# Patient Record
Sex: FEMALE | Race: WHITE | NOT HISPANIC OR LATINO | Employment: FULL TIME | URBAN - NONMETROPOLITAN AREA
[De-identification: names, ages, dates, MRNs, and addresses within clinical notes are randomized per-mention and may not be internally consistent; named-entity substitution may affect disease eponyms.]

---

## 2017-03-09 ENCOUNTER — HOSPITAL ENCOUNTER (EMERGENCY)
Facility: HOSPITAL | Age: 42
Discharge: HOME | End: 2017-03-10
Attending: EMERGENCY MEDICINE | Admitting: EMERGENCY MEDICINE
Payer: COMMERCIAL

## 2017-03-09 ENCOUNTER — HOSPITAL ENCOUNTER (OUTPATIENT)
Dept: CT IMAGING | Facility: HOSPITAL | Age: 42
Discharge: HOME | End: 2017-03-09
Attending: EMERGENCY MEDICINE
Payer: COMMERCIAL

## 2017-03-09 DIAGNOSIS — R10.9 ABDOMINAL PAIN: Primary | ICD-10-CM

## 2017-03-09 DIAGNOSIS — R55 SYNCOPE AND COLLAPSE: ICD-10-CM

## 2017-03-09 DIAGNOSIS — R55 NEAR SYNCOPE: ICD-10-CM

## 2017-03-09 PROCEDURE — 80053 COMPREHEN METABOLIC PANEL: CPT | Performed by: EMERGENCY MEDICINE

## 2017-03-09 PROCEDURE — 70450 CT HEAD/BRAIN W/O DYE: CPT

## 2017-03-09 PROCEDURE — 36415 COLL VENOUS BLD VENIPUNCTURE: CPT | Performed by: EMERGENCY MEDICINE

## 2017-03-09 PROCEDURE — 83690 ASSAY OF LIPASE: CPT | Performed by: EMERGENCY MEDICINE

## 2017-03-10 LAB
ALBUMIN SERPL BCP-MCNC: 3.8 G/DL (ref 3.5–5)
ALP SERPL-CCNC: 60 U/L (ref 46–116)
ALT SERPL W P-5'-P-CCNC: 25 U/L (ref 12–78)
ANION GAP SERPL CALCULATED.3IONS-SCNC: 8 MMOL/L (ref 4–13)
AST SERPL W P-5'-P-CCNC: 16 U/L (ref 5–45)
ATRIAL RATE: 99 BPM
BACTERIA UR QL AUTO: ABNORMAL /HPF
BASOPHILS # BLD AUTO: 0.01 THOUSANDS/ΜL (ref 0–0.1)
BILIRUB SERPL-MCNC: 1.73 MG/DL (ref 0.2–1)
BILIRUB UR QL STRIP: NEGATIVE
BUN SERPL-MCNC: 19 MG/DL (ref 5–25)
CALCIUM SERPL-MCNC: 8.8 MG/DL (ref 8.3–10.1)
CHLORIDE SERPL-SCNC: 100 MMOL/L (ref 100–108)
CLARITY UR: ABNORMAL
CO2 SERPL-SCNC: 28 MMOL/L (ref 21–32)
COLOR UR: YELLOW
CREAT SERPL-MCNC: 0.79 MG/DL (ref 0.6–1.3)
EOSINOPHIL # BLD AUTO: 0.01 THOUSAND/ΜL (ref 0–0.61)
EOSINOPHIL NFR BLD AUTO: 0 % (ref 0–6)
ERYTHROCYTE [DISTWIDTH] IN BLOOD BY AUTOMATED COUNT: 12.8 % (ref 11.6–15.1)
GFR SERPL CREATININE-BSD FRML MDRD: >60 ML/MIN/1.73SQ M
GLUCOSE SERPL-MCNC: 118 MG/DL (ref 65–140)
GLUCOSE UR STRIP-MCNC: NEGATIVE MG/DL
HCT VFR BLD AUTO: 40.2 % (ref 34.8–46.1)
HGB BLD-MCNC: 13.6 G/DL (ref 11.5–15.4)
HGB UR QL STRIP.AUTO: ABNORMAL
IMM GRANULOCYTES NFR BLD AUTO: 12 % (ref 0–2)
KETONES UR STRIP-MCNC: NEGATIVE MG/DL
LEUKOCYTE ESTERASE UR QL STRIP: NEGATIVE
LIPASE SERPL-CCNC: 72 U/L (ref 73–393)
LYMPHOCYTES # BLD AUTO: 1.17 THOUSANDS/ΜL (ref 0.6–4.47)
LYMPHOCYTES NFR BLD AUTO: 5 % (ref 14–44)
MCH RBC QN AUTO: 32.7 PG (ref 26.8–34.3)
MCHC RBC AUTO-ENTMCNC: 33.8 G/DL (ref 31.4–37.4)
MCV RBC AUTO: 97 FL (ref 82–98)
MONOCYTES # BLD AUTO: 0.45 THOUSAND/ΜL (ref 0.17–1.22)
MONOCYTES NFR BLD AUTO: 0 % (ref 4–12)
MUCOUS THREADS UR QL AUTO: ABNORMAL
NEUTROPHILS # BLD AUTO: 7.78 THOUSANDS/ΜL (ref 1.85–7.62)
NEUTS SEG NFR BLD AUTO: 83 % (ref 43–75)
NITRITE UR QL STRIP: NEGATIVE
NON-SQ EPI CELLS URNS QL MICRO: ABNORMAL /HPF
P AXIS: 36 DEGREES
PH UR STRIP.AUTO: 5.5 [PH] (ref 4.5–8)
PLATELET # BLD AUTO: 344 THOUSANDS/UL (ref 149–390)
PMV BLD AUTO: 9.4 FL (ref 8.9–12.7)
POTASSIUM SERPL-SCNC: 3.4 MMOL/L (ref 3.5–5.3)
PR INTERVAL: 152 MS
PROT SERPL-MCNC: 8 G/DL (ref 6.4–8.2)
PROT UR STRIP-MCNC: NEGATIVE MG/DL
QRS AXIS: 9 DEGREES
QRSD INTERVAL: 78 MS
QT INTERVAL: 342 MS
QTC INTERVAL: 438 MS
RBC # BLD AUTO: 4.16 MILLION/UL (ref 3.81–5.12)
RBC #/AREA URNS AUTO: ABNORMAL /HPF
SODIUM SERPL-SCNC: 136 MMOL/L (ref 136–145)
SP GR UR STRIP.AUTO: >1.03 (ref 1–1.03)
T WAVE AXIS: 11 DEGREES
TROPONIN I SERPL-MCNC: <0.02 NG/ML
UROBILINOGEN UR QL STRIP.AUTO: 0.2 E.U./DL
VENTRICULAR RATE: 99 BPM
WBC # BLD AUTO: 9.42 THOUSAND/UL (ref 4.31–10.16)
WBC #/AREA URNS AUTO: ABNORMAL /HPF

## 2017-03-10 PROCEDURE — 96375 TX/PRO/DX INJ NEW DRUG ADDON: CPT

## 2017-03-10 PROCEDURE — 93005 ELECTROCARDIOGRAM TRACING: CPT | Performed by: EMERGENCY MEDICINE

## 2017-03-10 PROCEDURE — 99284 EMERGENCY DEPT VISIT MOD MDM: CPT

## 2017-03-10 PROCEDURE — 87086 URINE CULTURE/COLONY COUNT: CPT | Performed by: EMERGENCY MEDICINE

## 2017-03-10 PROCEDURE — 96374 THER/PROPH/DIAG INJ IV PUSH: CPT

## 2017-03-10 PROCEDURE — 85025 COMPLETE CBC W/AUTO DIFF WBC: CPT | Performed by: EMERGENCY MEDICINE

## 2017-03-10 PROCEDURE — 84484 ASSAY OF TROPONIN QUANT: CPT | Performed by: EMERGENCY MEDICINE

## 2017-03-10 PROCEDURE — 81001 URINALYSIS AUTO W/SCOPE: CPT | Performed by: EMERGENCY MEDICINE

## 2017-03-11 LAB — BACTERIA UR CULT: NORMAL

## 2020-06-01 ENCOUNTER — DOCTOR'S OFFICE (OUTPATIENT)
Dept: URBAN - METROPOLITAN AREA CLINIC 125 | Facility: CLINIC | Age: 45
Setting detail: OPHTHALMOLOGY
End: 2020-06-01
Payer: COMMERCIAL

## 2020-06-01 ENCOUNTER — RX ONLY (RX ONLY)
Age: 45
End: 2020-06-01

## 2020-06-01 DIAGNOSIS — H52.4: ICD-10-CM

## 2020-06-01 DIAGNOSIS — H52.223: ICD-10-CM

## 2020-06-01 PROCEDURE — 92015 DETERMINE REFRACTIVE STATE: CPT | Performed by: OPTOMETRIST

## 2020-06-01 PROCEDURE — 92004 COMPRE OPH EXAM NEW PT 1/>: CPT | Performed by: OPTOMETRIST

## 2020-06-01 ASSESSMENT — CONFRONTATIONAL VISUAL FIELD TEST (CVF)
OS_FINDINGS: FULL
OD_FINDINGS: FULL

## 2020-06-03 ASSESSMENT — REFRACTION_CURRENTRX
OD_AXIS: 002
OD_VPRISM_DIRECTION: SV
OD_CYLINDER: -5.25
OD_OVR_VA: 20/
OS_OVR_VA: 20/
OD_SPHERE: -0.50
OS_VPRISM_DIRECTION: SV
OS_SPHERE: -0.25
OS_CYLINDER: -5.00
OS_AXIS: 171

## 2020-06-03 ASSESSMENT — REFRACTION_MANIFEST
OD_AXIS: 005
OD_ADD: +1.50
OS_ADD: +1.50
OD_CYLINDER: -5.25
OD_SPHERE: PLANO
OS_AXIS: 175
OS_VA1: 20/25
OS_SPHERE: PLANO
OD_VA1: 20/25
OS_CYLINDER: -5.00

## 2020-06-03 ASSESSMENT — KERATOMETRY
OS_K1POWER_DIOPTERS: 41.50
OS_AXISANGLE_DEGREES: 001
OD_AXISANGLE_DEGREES: 005
OD_K1POWER_DIOPTERS: 41.25
OS_K2POWER_DIOPTERS: 46.25
OD_K2POWER_DIOPTERS: 46.00

## 2020-06-03 ASSESSMENT — SPHEQUIV_DERIVED
OD_SPHEQUIV: -2
OS_SPHEQUIV: -2.5

## 2020-06-03 ASSESSMENT — REFRACTION_AUTOREFRACTION
OS_AXIS: 178
OS_CYLINDER: -4.50
OD_CYLINDER: -4.50
OS_SPHERE: -0.25
OD_AXIS: 008
OD_SPHERE: +0.25

## 2020-06-03 ASSESSMENT — VISUAL ACUITY
OS_BCVA: 20/40
OD_BCVA: 20/20-1

## 2020-06-03 ASSESSMENT — AXIALLENGTH_DERIVED
OS_AL: 24.4589
OD_AL: 24.3487

## 2020-07-31 ENCOUNTER — DOCTOR'S OFFICE (OUTPATIENT)
Dept: URBAN - NONMETROPOLITAN AREA CLINIC 1 | Facility: CLINIC | Age: 45
Setting detail: OPHTHALMOLOGY
End: 2020-07-31
Payer: COMMERCIAL

## 2020-07-31 DIAGNOSIS — H40.2234: ICD-10-CM

## 2020-07-31 PROCEDURE — 76514 ECHO EXAM OF EYE THICKNESS: CPT | Performed by: OPHTHALMOLOGY

## 2020-07-31 PROCEDURE — 92133 CPTRZD OPH DX IMG PST SGM ON: CPT | Performed by: OPHTHALMOLOGY

## 2020-07-31 PROCEDURE — 92014 COMPRE OPH EXAM EST PT 1/>: CPT | Performed by: OPHTHALMOLOGY

## 2020-07-31 ASSESSMENT — REFRACTION_CURRENTRX
OD_OVR_VA: 20/
OD_CYLINDER: -5.25
OD_SPHERE: -0.25
OS_AXIS: 170
OS_ADD: +1.00
OD_VPRISM_DIRECTION: PROGS
OD_ADD: +1.00
OS_CYLINDER: -4.75
OS_SPHERE: PLANO
OD_AXIS: 012
OS_VPRISM_DIRECTION: PROGS
OS_OVR_VA: 20/

## 2020-07-31 ASSESSMENT — REFRACTION_MANIFEST
OS_ADD: +1.50
OD_AXIS: 005
OS_AXIS: 175
OS_VA1: 20/25
OD_ADD: +1.50
OS_SPHERE: PLANO
OD_CYLINDER: -5.25
OD_SPHERE: PLANO
OD_VA1: 20/25
OS_CYLINDER: -5.00

## 2020-07-31 ASSESSMENT — REFRACTION_AUTOREFRACTION
OD_AXIS: 006
OD_SPHERE: +0.25
OS_CYLINDER: -4.50
OD_CYLINDER: -4.75
OS_SPHERE: -0.25
OS_AXIS: 176

## 2020-07-31 ASSESSMENT — KERATOMETRY
OD_K2POWER_DIOPTERS: 47.50
OD_AXISANGLE_DEGREES: 095
OS_K1POWER_DIOPTERS: 41.50
OS_AXISANGLE_DEGREES: 088
OS_K2POWER_DIOPTERS: 46.25
OD_K1POWER_DIOPTERS: 42.25

## 2020-07-31 ASSESSMENT — VISUAL ACUITY
OS_BCVA: 20/25-1
OD_BCVA: 20/20

## 2020-07-31 ASSESSMENT — SPHEQUIV_DERIVED
OD_SPHEQUIV: -2.125
OS_SPHEQUIV: -2.5

## 2020-07-31 ASSESSMENT — PACHYMETRY
OS_CT_UM: 587
OD_CT_UM: 580
OD_CT_CORRECTION: -3
OS_CT_CORRECTION: -3

## 2020-07-31 ASSESSMENT — AXIALLENGTH_DERIVED
OS_AL: 24.4589
OD_AL: 23.9192

## 2020-07-31 ASSESSMENT — CONFRONTATIONAL VISUAL FIELD TEST (CVF)
OD_FINDINGS: FULL
OS_FINDINGS: FULL

## 2020-09-02 ENCOUNTER — OFFICE VISIT (OUTPATIENT)
Dept: FAMILY MEDICINE CLINIC | Facility: CLINIC | Age: 45
End: 2020-09-02
Payer: COMMERCIAL

## 2020-09-02 VITALS
DIASTOLIC BLOOD PRESSURE: 82 MMHG | TEMPERATURE: 98.2 F | HEART RATE: 96 BPM | HEIGHT: 62 IN | WEIGHT: 139.8 LBS | OXYGEN SATURATION: 97 % | SYSTOLIC BLOOD PRESSURE: 136 MMHG | BODY MASS INDEX: 25.73 KG/M2

## 2020-09-02 DIAGNOSIS — G43.019 INTRACTABLE MIGRAINE WITHOUT AURA AND WITHOUT STATUS MIGRAINOSUS: ICD-10-CM

## 2020-09-02 DIAGNOSIS — Z13.9 SCREENING FOR UNSPECIFIED CONDITION: ICD-10-CM

## 2020-09-02 DIAGNOSIS — F41.1 GENERALIZED ANXIETY DISORDER: Primary | ICD-10-CM

## 2020-09-02 DIAGNOSIS — Z13.220 SCREENING FOR HYPERLIPIDEMIA: ICD-10-CM

## 2020-09-02 DIAGNOSIS — R25.2 MUSCLE CRAMPS: ICD-10-CM

## 2020-09-02 DIAGNOSIS — Z13.29 SCREENING FOR THYROID DISORDER: ICD-10-CM

## 2020-09-02 PROCEDURE — 99203 OFFICE O/P NEW LOW 30 MIN: CPT | Performed by: PHYSICIAN ASSISTANT

## 2020-09-02 RX ORDER — RIZATRIPTAN BENZOATE 5 MG/1
5 TABLET, ORALLY DISINTEGRATING ORAL ONCE AS NEEDED
Qty: 20 TABLET | Refills: 0 | Status: SHIPPED | OUTPATIENT
Start: 2020-09-02 | End: 2021-12-02 | Stop reason: SDUPTHER

## 2020-09-02 RX ORDER — ACETAMINOPHEN 325 MG/1
650 TABLET ORAL EVERY 6 HOURS PRN
COMMUNITY

## 2020-09-02 RX ORDER — SERTRALINE HYDROCHLORIDE 25 MG/1
25 TABLET, FILM COATED ORAL DAILY
Qty: 30 TABLET | Refills: 0 | Status: SHIPPED | OUTPATIENT
Start: 2020-09-02 | End: 2020-11-18 | Stop reason: SDUPTHER

## 2020-09-02 NOTE — PROGRESS NOTES
Assessment/Plan:    Problem List Items Addressed This Visit     None      Visit Diagnoses     Generalized anxiety disorder    -  Primary    Relevant Medications    sertraline (ZOLOFT) 25 mg tablet    Screening for hyperlipidemia        Relevant Orders    Comprehensive metabolic panel    Lipid panel    Screening for thyroid disorder        Relevant Orders    TSH, 3rd generation with Free T4 reflex    Muscle cramps        Relevant Orders    Comprehensive metabolic panel    Screening for unspecified condition        Relevant Orders    CBC and differential    Comprehensive metabolic panel    Lipid panel    TSH, 3rd generation with Free T4 reflex    Intractable migraine without aura and without status migrainosus        Relevant Medications    acetaminophen (TYLENOL) 325 mg tablet    sertraline (ZOLOFT) 25 mg tablet    rizatriptan (MAXALT-MLT) 5 MG disintegrating tablet           Diagnoses and all orders for this visit:    Generalized anxiety disorder  -     sertraline (ZOLOFT) 25 mg tablet; Take 1 tablet (25 mg total) by mouth daily    Screening for hyperlipidemia  -     Comprehensive metabolic panel; Future  -     Lipid panel; Future    Screening for thyroid disorder  -     TSH, 3rd generation with Free T4 reflex; Future    Muscle cramps  -     Comprehensive metabolic panel; Future    Screening for unspecified condition  -     CBC and differential; Future  -     Comprehensive metabolic panel; Future  -     Lipid panel; Future  -     TSH, 3rd generation with Free T4 reflex; Future    Intractable migraine without aura and without status migrainosus  -     rizatriptan (MAXALT-MLT) 5 MG disintegrating tablet; Take 1 tablet (5 mg total) by mouth once as needed for migraine for up to 1 dose May repeat in 2 hours if needed    Other orders  -     acetaminophen (TYLENOL) 325 mg tablet; Take 650 mg by mouth every 6 (six) hours as needed for mild pain        Will start on zoloft and see her back in one month     Refilled Maxalt Refused flu shot  Screening labs ordered  Subjective:      Patient ID: Khushboo Narvaez is a 41 y o  female  Jluis Mckeon is here today to get reestablished  She is complaining of worsening anxiety  She has always been an anxious person, but the pandemic has put her over the edge  She feels like when she wakes up in the morning, her heart races the entire day  She has a lot of stressors in her life  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, 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, and her daughter is in college  She was prescribed ativan many years ago, but never took it  She would like to start something for anxiety  She has noticed that she has a short temper since her anxiety has been elevated  She is sleeping well  She denies any suicidal or homicidal thoughts  She would also like to get up to date with her screening labs  She has been getting muscle cramps in bilateral calf muscles  She does not exercise regularly  She knows that she does not drink enough fluids  She gets occasional migraine headaches  She does have photophobia and phonophobia associated with the headaches  She used to have Maxalt that she would use as needed  She gets on about every 3 months  She is asking for a refill  She is up to date with her gynecology exams, eye exams, and dental cleanings  She is scheduled for a mammogram in October  She refuses a flu shot  The following portions of the patient's history were reviewed and updated as appropriate:   She has no past medical history on file  ,  does not have a problem list on file  ,   has a past surgical history that includes Cholecystectomy;  section; and Sinus surgery  ,  family history includes No Known Problems in her father and mother  ,   reports that she has never smoked  She has never used smokeless tobacco  She reports previous alcohol use  She reports previous drug use ,  has No Known Allergies     Current Outpatient Medications   Medication Sig Dispense Refill    acetaminophen (TYLENOL) 325 mg tablet Take 650 mg by mouth every 6 (six) hours as needed for mild pain      rizatriptan (MAXALT-MLT) 5 MG disintegrating tablet Take 1 tablet (5 mg total) by mouth once as needed for migraine for up to 1 dose May repeat in 2 hours if needed 20 tablet 0    sertraline (ZOLOFT) 25 mg tablet Take 1 tablet (25 mg total) by mouth daily 30 tablet 0     No current facility-administered medications for this visit  Review of Systems   Constitutional: Negative for chills, diaphoresis, fatigue and fever  HENT: Negative for congestion, ear pain, postnasal drip, rhinorrhea, sneezing, sore throat and trouble swallowing  Eyes: Negative for pain and visual disturbance  Respiratory: Negative for apnea, cough, shortness of breath and wheezing  Cardiovascular: Negative for chest pain and palpitations  Gastrointestinal: Negative for abdominal pain, constipation, diarrhea, nausea and vomiting  Genitourinary: Negative for dysuria and hematuria  Musculoskeletal: Negative for arthralgias, gait problem and myalgias  Neurological: Positive for headaches  Negative for dizziness, syncope, weakness, light-headedness and numbness  Psychiatric/Behavioral: Negative for suicidal ideas  The patient is nervous/anxious  Objective:  Vitals:    09/02/20 1108   BP: 136/82   Pulse: 96   Temp: 98 2 °F (36 8 °C)   SpO2: 97%   Weight: 63 4 kg (139 lb 12 8 oz)   Height: 5' 2" (1 575 m)     Body mass index is 25 57 kg/m²  Physical Exam  Vitals signs and nursing note reviewed  Constitutional:       Appearance: She is well-developed  HENT:      Head: Normocephalic and atraumatic  Right Ear: Tympanic membrane, ear canal and external ear normal       Left Ear: Tympanic membrane, ear canal and external ear normal       Nose: Nose normal       Mouth/Throat:      Pharynx: No oropharyngeal exudate or posterior oropharyngeal erythema     Eyes:      Pupils: Pupils are equal, round, and reactive to light  Neck:      Musculoskeletal: Normal range of motion and neck supple  Cardiovascular:      Rate and Rhythm: Normal rate and regular rhythm  Heart sounds: Normal heart sounds  No murmur  No friction rub  No gallop  Pulmonary:      Effort: Pulmonary effort is normal  No respiratory distress  Breath sounds: Normal breath sounds  No wheezing or rales  Abdominal:      General: Bowel sounds are normal       Palpations: Abdomen is soft  Tenderness: There is no abdominal tenderness  There is no guarding or rebound  Musculoskeletal: Normal range of motion  Lymphadenopathy:      Cervical: No cervical adenopathy  Skin:     General: Skin is warm and dry  Neurological:      Mental Status: She is alert and oriented to person, place, and time  Psychiatric:         Mood and Affect: Mood is anxious  Mood is not depressed  Behavior: Behavior normal          Thought Content: Thought content normal  Thought content does not include homicidal or suicidal ideation  Thought content does not include homicidal or suicidal plan  Judgment: Judgment normal          BMI Counseling: Body mass index is 25 57 kg/m²  The BMI is above normal  Nutrition recommendations include decreasing overall calorie intake, 3-5 servings of fruits/vegetables daily and moderation in carbohydrate intake  Exercise recommendations include exercising 3-5 times per week

## 2020-09-03 ENCOUNTER — APPOINTMENT (OUTPATIENT)
Dept: LAB | Facility: MEDICAL CENTER | Age: 45
End: 2020-09-03
Payer: COMMERCIAL

## 2020-09-03 DIAGNOSIS — Z13.29 SCREENING FOR THYROID DISORDER: ICD-10-CM

## 2020-09-03 DIAGNOSIS — R25.2 MUSCLE CRAMPS: ICD-10-CM

## 2020-09-03 DIAGNOSIS — Z13.9 SCREENING FOR UNSPECIFIED CONDITION: ICD-10-CM

## 2020-09-03 DIAGNOSIS — Z13.220 SCREENING FOR HYPERLIPIDEMIA: ICD-10-CM

## 2020-09-03 LAB
ALBUMIN SERPL BCP-MCNC: 4 G/DL (ref 3.5–5)
ALP SERPL-CCNC: 47 U/L (ref 46–116)
ALT SERPL W P-5'-P-CCNC: 18 U/L (ref 12–78)
ANION GAP SERPL CALCULATED.3IONS-SCNC: 6 MMOL/L (ref 4–13)
AST SERPL W P-5'-P-CCNC: 8 U/L (ref 5–45)
BASOPHILS # BLD AUTO: 0.08 THOUSANDS/ΜL (ref 0–0.1)
BASOPHILS NFR BLD AUTO: 1 % (ref 0–1)
BILIRUB SERPL-MCNC: 1.13 MG/DL (ref 0.2–1)
BUN SERPL-MCNC: 9 MG/DL (ref 5–25)
CALCIUM SERPL-MCNC: 9.3 MG/DL (ref 8.3–10.1)
CHLORIDE SERPL-SCNC: 107 MMOL/L (ref 100–108)
CHOLEST SERPL-MCNC: 213 MG/DL (ref 50–200)
CO2 SERPL-SCNC: 28 MMOL/L (ref 21–32)
CREAT SERPL-MCNC: 0.82 MG/DL (ref 0.6–1.3)
EOSINOPHIL # BLD AUTO: 0.1 THOUSAND/ΜL (ref 0–0.61)
EOSINOPHIL NFR BLD AUTO: 2 % (ref 0–6)
ERYTHROCYTE [DISTWIDTH] IN BLOOD BY AUTOMATED COUNT: 12.3 % (ref 11.6–15.1)
GFR SERPL CREATININE-BSD FRML MDRD: 87 ML/MIN/1.73SQ M
GLUCOSE P FAST SERPL-MCNC: 76 MG/DL (ref 65–99)
HCT VFR BLD AUTO: 39.4 % (ref 34.8–46.1)
HDLC SERPL-MCNC: 55 MG/DL
HGB BLD-MCNC: 13.1 G/DL (ref 11.5–15.4)
IMM GRANULOCYTES # BLD AUTO: 0.01 THOUSAND/UL (ref 0–0.2)
IMM GRANULOCYTES NFR BLD AUTO: 0 % (ref 0–2)
LDLC SERPL CALC-MCNC: 136 MG/DL (ref 0–100)
LYMPHOCYTES # BLD AUTO: 2.49 THOUSANDS/ΜL (ref 0.6–4.47)
LYMPHOCYTES NFR BLD AUTO: 41 % (ref 14–44)
MCH RBC QN AUTO: 32.3 PG (ref 26.8–34.3)
MCHC RBC AUTO-ENTMCNC: 33.2 G/DL (ref 31.4–37.4)
MCV RBC AUTO: 97 FL (ref 82–98)
MONOCYTES # BLD AUTO: 0.44 THOUSAND/ΜL (ref 0.17–1.22)
MONOCYTES NFR BLD AUTO: 7 % (ref 4–12)
NEUTROPHILS # BLD AUTO: 2.92 THOUSANDS/ΜL (ref 1.85–7.62)
NEUTS SEG NFR BLD AUTO: 49 % (ref 43–75)
NONHDLC SERPL-MCNC: 158 MG/DL
NRBC BLD AUTO-RTO: 0 /100 WBCS
PLATELET # BLD AUTO: 369 THOUSANDS/UL (ref 149–390)
PMV BLD AUTO: 9.6 FL (ref 8.9–12.7)
POTASSIUM SERPL-SCNC: 3.7 MMOL/L (ref 3.5–5.3)
PROT SERPL-MCNC: 7.8 G/DL (ref 6.4–8.2)
RBC # BLD AUTO: 4.05 MILLION/UL (ref 3.81–5.12)
SODIUM SERPL-SCNC: 141 MMOL/L (ref 136–145)
TRIGL SERPL-MCNC: 112 MG/DL
TSH SERPL DL<=0.05 MIU/L-ACNC: 1.18 UIU/ML (ref 0.36–3.74)
WBC # BLD AUTO: 6.04 THOUSAND/UL (ref 4.31–10.16)

## 2020-09-03 PROCEDURE — 80053 COMPREHEN METABOLIC PANEL: CPT

## 2020-09-03 PROCEDURE — 84443 ASSAY THYROID STIM HORMONE: CPT

## 2020-09-03 PROCEDURE — 80061 LIPID PANEL: CPT

## 2020-09-03 PROCEDURE — 85025 COMPLETE CBC W/AUTO DIFF WBC: CPT

## 2020-09-03 PROCEDURE — 36415 COLL VENOUS BLD VENIPUNCTURE: CPT

## 2020-09-14 ENCOUNTER — OFFICE VISIT (OUTPATIENT)
Dept: FAMILY MEDICINE CLINIC | Facility: CLINIC | Age: 45
End: 2020-09-14
Payer: COMMERCIAL

## 2020-09-14 VITALS
WEIGHT: 138.2 LBS | SYSTOLIC BLOOD PRESSURE: 110 MMHG | HEART RATE: 60 BPM | OXYGEN SATURATION: 99 % | BODY MASS INDEX: 25.43 KG/M2 | TEMPERATURE: 98 F | HEIGHT: 62 IN | DIASTOLIC BLOOD PRESSURE: 82 MMHG

## 2020-09-14 DIAGNOSIS — R59.1 LYMPHADENOPATHY OF HEAD AND NECK: ICD-10-CM

## 2020-09-14 DIAGNOSIS — H10.31 ACUTE CONJUNCTIVITIS OF RIGHT EYE, UNSPECIFIED ACUTE CONJUNCTIVITIS TYPE: Primary | ICD-10-CM

## 2020-09-14 DIAGNOSIS — Z13.31 DEPRESSION SCREENING NEGATIVE: ICD-10-CM

## 2020-09-14 PROCEDURE — 3725F SCREEN DEPRESSION PERFORMED: CPT | Performed by: PHYSICIAN ASSISTANT

## 2020-09-14 PROCEDURE — 99214 OFFICE O/P EST MOD 30 MIN: CPT | Performed by: PHYSICIAN ASSISTANT

## 2020-09-14 RX ORDER — AMOXICILLIN 500 MG/1
500 CAPSULE ORAL EVERY 8 HOURS SCHEDULED
Qty: 30 CAPSULE | Refills: 0 | Status: SHIPPED | OUTPATIENT
Start: 2020-09-14 | End: 2020-09-16 | Stop reason: ALTCHOICE

## 2020-09-14 RX ORDER — TOBRAMYCIN 3 MG/ML
1 SOLUTION/ DROPS OPHTHALMIC
Qty: 5 ML | Refills: 0 | Status: SHIPPED | OUTPATIENT
Start: 2020-09-14 | End: 2020-09-16 | Stop reason: ALTCHOICE

## 2020-09-14 NOTE — PROGRESS NOTES
Assessment/Plan:    Problem List Items Addressed This Visit     None      Visit Diagnoses     Acute conjunctivitis of right eye, unspecified acute conjunctivitis type    -  Primary    Relevant Medications    tobramycin (Tobrex) 0 3 % SOLN    amoxicillin (AMOXIL) 500 mg capsule    Lymphadenopathy of head and neck        Relevant Medications    tobramycin (Tobrex) 0 3 % SOLN    amoxicillin (AMOXIL) 500 mg capsule    Depression screening negative               Diagnoses and all orders for this visit:    Acute conjunctivitis of right eye, unspecified acute conjunctivitis type  -     tobramycin (Tobrex) 0 3 % SOLN; Administer 1 drop to the right eye every 4 (four) hours while awake for 7 days  -     amoxicillin (AMOXIL) 500 mg capsule; Take 1 capsule (500 mg total) by mouth every 8 (eight) hours for 10 days    Lymphadenopathy of head and neck  -     tobramycin (Tobrex) 0 3 % SOLN; Administer 1 drop to the right eye every 4 (four) hours while awake for 7 days  -     amoxicillin (AMOXIL) 500 mg capsule; Take 1 capsule (500 mg total) by mouth every 8 (eight) hours for 10 days    Depression screening negative        LAD likely due to conjunctivitis  If symptoms worsen or persist beyond 7 days, pt will RTO  Subjective:      Patient ID: Radha Vernon is a 41 y o  female  Jeannine Deng is here today due to enlarged, tender nodes along her R ear and neck x 3 days  Also has some redness/irritation in her R eye  Eye was not crusted shut this morning, but it is watering/draining  Denies fevers/chills  The following portions of the patient's history were reviewed and updated as appropriate:   She has no past medical history on file  ,  does not have a problem list on file  ,   has a past surgical history that includes Cholecystectomy;  section; and Sinus surgery  ,  family history includes No Known Problems in her father and mother  ,   reports that she has never smoked   She has never used smokeless tobacco  She reports previous alcohol use  She reports previous drug use ,  has No Known Allergies     Current Outpatient Medications   Medication Sig Dispense Refill    acetaminophen (TYLENOL) 325 mg tablet Take 650 mg by mouth every 6 (six) hours as needed for mild pain      rizatriptan (MAXALT-MLT) 5 MG disintegrating tablet Take 1 tablet (5 mg total) by mouth once as needed for migraine for up to 1 dose May repeat in 2 hours if needed 20 tablet 0    sertraline (ZOLOFT) 25 mg tablet Take 1 tablet (25 mg total) by mouth daily 30 tablet 0    amoxicillin (AMOXIL) 500 mg capsule Take 1 capsule (500 mg total) by mouth every 8 (eight) hours for 10 days 30 capsule 0    tobramycin (Tobrex) 0 3 % SOLN Administer 1 drop to the right eye every 4 (four) hours while awake for 7 days 5 mL 0     No current facility-administered medications for this visit  Review of Systems   Constitutional: Negative for activity change, appetite change, chills, diaphoresis, fatigue, fever and unexpected weight change  HENT: Negative for congestion, ear pain, postnasal drip, rhinorrhea, sinus pressure, sinus pain, sneezing, sore throat, tinnitus and voice change  Eyes: Positive for pain, discharge and redness  Negative for visual disturbance  Respiratory: Negative for cough, chest tightness, shortness of breath and wheezing  Cardiovascular: Negative for chest pain, palpitations and leg swelling  Gastrointestinal: Negative for abdominal pain, blood in stool, constipation, diarrhea, nausea and vomiting  Genitourinary: Negative for difficulty urinating, dysuria, frequency, hematuria and urgency  Musculoskeletal: Negative for arthralgias, back pain, gait problem, joint swelling, myalgias, neck pain and neck stiffness  Skin: Negative for color change, pallor, rash and wound  Neurological: Negative for dizziness, tremors, weakness, light-headedness and headaches  Hematological: Positive for adenopathy     Psychiatric/Behavioral: Negative for dysphoric mood, self-injury, sleep disturbance and suicidal ideas  The patient is not nervous/anxious  Objective:  Vitals:    09/14/20 1314   BP: 110/82   Pulse: 60   Temp: 98 °F (36 7 °C)   SpO2: 99%   Weight: 62 7 kg (138 lb 3 2 oz)   Height: 5' 2" (1 575 m)     Body mass index is 25 28 kg/m²  Physical Exam  Vitals signs reviewed  Constitutional:       General: She is not in acute distress  Appearance: She is well-developed  She is not diaphoretic  HENT:      Head: Normocephalic and atraumatic  Right Ear: Hearing, tympanic membrane, ear canal and external ear normal       Left Ear: Hearing, tympanic membrane, ear canal and external ear normal       Mouth/Throat:      Pharynx: Uvula midline  No oropharyngeal exudate  Eyes:      General: Lids are normal  No scleral icterus  Right eye: Discharge (watery) present  Left eye: No discharge  Conjunctiva/sclera:      Right eye: Right conjunctiva is injected  Left eye: Left conjunctiva is not injected  Pupils: Pupils are equal, round, and reactive to light  Pupils are equal    Neck:      Musculoskeletal: Neck supple  Thyroid: No thyromegaly  Vascular: No carotid bruit  Cardiovascular:      Rate and Rhythm: Normal rate and regular rhythm  Heart sounds: Normal heart sounds  No murmur  Pulmonary:      Effort: Pulmonary effort is normal  No respiratory distress  Breath sounds: Normal breath sounds  No wheezing  Abdominal:      General: Bowel sounds are normal  There is no distension  Palpations: Abdomen is soft  There is no mass  Tenderness: There is no abdominal tenderness  There is no guarding or rebound  Musculoskeletal: Normal range of motion  General: No tenderness  Lymphadenopathy:      Cervical: No cervical adenopathy  Skin:     General: Skin is warm and dry  Findings: No erythema or rash     Neurological:      Mental Status: She is alert and oriented to person, place, and time  Psychiatric:         Behavior: Behavior normal          Thought Content:  Thought content normal          Judgment: Judgment normal

## 2020-09-16 ENCOUNTER — DOCTOR'S OFFICE (OUTPATIENT)
Dept: URBAN - NONMETROPOLITAN AREA CLINIC 1 | Facility: CLINIC | Age: 45
Setting detail: OPHTHALMOLOGY
End: 2020-09-16
Payer: COMMERCIAL

## 2020-09-16 ENCOUNTER — OFFICE VISIT (OUTPATIENT)
Dept: FAMILY MEDICINE CLINIC | Facility: CLINIC | Age: 45
End: 2020-09-16
Payer: COMMERCIAL

## 2020-09-16 VITALS
HEART RATE: 72 BPM | HEIGHT: 62 IN | BODY MASS INDEX: 25.88 KG/M2 | WEIGHT: 140.6 LBS | TEMPERATURE: 97.7 F | SYSTOLIC BLOOD PRESSURE: 126 MMHG | DIASTOLIC BLOOD PRESSURE: 78 MMHG | OXYGEN SATURATION: 98 %

## 2020-09-16 DIAGNOSIS — B02.30 HERPES ZOSTER WITH OPHTHALMIC COMPLICATION, UNSPECIFIED HERPES ZOSTER EYE DISEASE: Primary | ICD-10-CM

## 2020-09-16 DIAGNOSIS — H40.2234: ICD-10-CM

## 2020-09-16 DIAGNOSIS — H10.31: ICD-10-CM

## 2020-09-16 PROCEDURE — 92012 INTRM OPH EXAM EST PATIENT: CPT | Performed by: OPHTHALMOLOGY

## 2020-09-16 PROCEDURE — 1036F TOBACCO NON-USER: CPT | Performed by: PHYSICIAN ASSISTANT

## 2020-09-16 PROCEDURE — 99214 OFFICE O/P EST MOD 30 MIN: CPT | Performed by: PHYSICIAN ASSISTANT

## 2020-09-16 RX ORDER — ACYCLOVIR 800 MG/1
800 TABLET ORAL
Qty: 50 TABLET | Refills: 0 | Status: SHIPPED | OUTPATIENT
Start: 2020-09-16 | End: 2022-05-13

## 2020-09-16 ASSESSMENT — REFRACTION_AUTOREFRACTION
OS_CYLINDER: -4.50
OD_SPHERE: +0.25
OS_SPHERE: -0.25
OD_AXIS: 006
OS_AXIS: 176
OD_CYLINDER: -4.75

## 2020-09-16 ASSESSMENT — KERATOMETRY
OD_K1POWER_DIOPTERS: 42.25
OS_K1POWER_DIOPTERS: 41.50
OD_AXISANGLE_DEGREES: 095
OS_AXISANGLE_DEGREES: 088
OS_K2POWER_DIOPTERS: 46.25
OD_K2POWER_DIOPTERS: 47.50

## 2020-09-16 ASSESSMENT — REFRACTION_CURRENTRX
OD_AXIS: 012
OS_VPRISM_DIRECTION: PROGS
OS_OVR_VA: 20/
OD_SPHERE: -0.25
OS_CYLINDER: -4.75
OD_OVR_VA: 20/
OD_CYLINDER: -5.25
OS_AXIS: 170
OS_SPHERE: PLANO
OD_VPRISM_DIRECTION: PROGS
OD_ADD: +1.00
OS_ADD: +1.00

## 2020-09-16 ASSESSMENT — CONFRONTATIONAL VISUAL FIELD TEST (CVF)
OD_FINDINGS: FULL
OS_FINDINGS: FULL

## 2020-09-16 ASSESSMENT — SPHEQUIV_DERIVED
OS_SPHEQUIV: -2.5
OD_SPHEQUIV: -2.125

## 2020-09-16 ASSESSMENT — REFRACTION_MANIFEST
OS_SPHERE: PLANO
OS_CYLINDER: -5.00
OS_AXIS: 175
OD_AXIS: 005
OD_ADD: +1.50
OD_CYLINDER: -5.25
OD_VA1: 20/25
OS_VA1: 20/25
OS_ADD: +1.50
OD_SPHERE: PLANO

## 2020-09-16 ASSESSMENT — VISUAL ACUITY
OD_BCVA: 20/20
OS_BCVA: 20/25-1

## 2020-09-16 ASSESSMENT — AXIALLENGTH_DERIVED
OS_AL: 24.4589
OD_AL: 23.9192

## 2020-09-16 ASSESSMENT — LID EXAM ASSESSMENTS: OD_EDEMA: RUL 2+

## 2020-09-16 NOTE — LETTER
September 16, 2020     Patient: Shannan Tejeda   YOB: 1975   Date of Visit: 9/16/2020       To Whom it May Concern:    Laura Hurley is under my professional care  She was seen in my office on 9/16/2020  She should be excused from school 9/15/20-9/16/20  She may return to school on 9/17/2020  If you have any questions or concerns, please don't hesitate to call           Sincerely,          Geni Woodard PA-C

## 2020-09-16 NOTE — PROGRESS NOTES
Assessment/Plan:    Problem List Items Addressed This Visit     None      Visit Diagnoses     Herpes zoster with ophthalmic complication, unspecified herpes zoster eye disease    -  Primary    Relevant Medications    acyclovir (ZOVIRAX) 800 mg tablet    Other Relevant Orders    Ambulatory referral to Ophthalmology         Diagnoses and all orders for this visit:    Herpes zoster with ophthalmic complication, unspecified herpes zoster eye disease  -     acyclovir (ZOVIRAX) 800 mg tablet; Take 1 tablet (800 mg total) by mouth every 4 (four) hours while awake for 10 days  -     Ambulatory referral to Ophthalmology; Future        Will start her on Acyclovir  STAT appointment given at ophthalmologist office today at 1:30  She will notify us of any new or worsening symptoms  Subjective:      Patient ID: Judson Hodgkin is a 41 y o  female  Jm Sherman is here today for a follow-up for right eye swelling  She started with some sore lumps behind and in front of her right ear  She was having some irritation and redness of her right eye with clear drainage  She came in to see Claudetta Repine on Monday who put her on tobrex ophthalmic drops and amoxicillin  She states that Tuesday morning before starting the drop and antibiotic, her entire eyelid became red and swollen  She started taking the antibiotic and using the eye drop, but it did not help  She has also been using a warm compress  Since then, she noticed that her scalp developed some sores that are itchy and sore  She does have some burning in her scalp  She also feels at times that she gets a shooting pain behind her right eye  She denies any vision changes, fevers, or chills  She has recently been under a lot of stress  The following portions of the patient's history were reviewed and updated as appropriate:   She has no past medical history on file  ,  does not have a problem list on file  ,   has a past surgical history that includes Cholecystectomy;   section; and Sinus surgery  ,  family history includes No Known Problems in her father and mother  ,   reports that she has never smoked  She has never used smokeless tobacco  She reports previous alcohol use  She reports previous drug use ,  has No Known Allergies     Current Outpatient Medications   Medication Sig Dispense Refill    acetaminophen (TYLENOL) 325 mg tablet Take 650 mg by mouth every 6 (six) hours as needed for mild pain      acyclovir (ZOVIRAX) 800 mg tablet Take 1 tablet (800 mg total) by mouth every 4 (four) hours while awake for 10 days 50 tablet 0    rizatriptan (MAXALT-MLT) 5 MG disintegrating tablet Take 1 tablet (5 mg total) by mouth once as needed for migraine for up to 1 dose May repeat in 2 hours if needed 20 tablet 0    sertraline (ZOLOFT) 25 mg tablet Take 1 tablet (25 mg total) by mouth daily 30 tablet 0     No current facility-administered medications for this visit  Review of Systems   Constitutional: Negative for chills, diaphoresis, fatigue and fever  HENT: Negative for congestion, ear pain, postnasal drip, rhinorrhea, sneezing, sore throat and trouble swallowing  Eyes: Positive for discharge (clear) and redness  Negative for photophobia, pain and visual disturbance  +Right eyelid swelling   Respiratory: Negative for apnea, cough, shortness of breath and wheezing  Cardiovascular: Negative for chest pain and palpitations  Gastrointestinal: Negative for abdominal pain, constipation, diarrhea, nausea and vomiting  Genitourinary: Negative for dysuria and hematuria  Musculoskeletal: Negative for arthralgias, gait problem and myalgias  Skin: Positive for wound (sores on scalp)  Neurological: Positive for headaches  Negative for dizziness, syncope, weakness, light-headedness and numbness  Hematological: Positive for adenopathy (post-auricular)  Psychiatric/Behavioral: Negative for suicidal ideas  The patient is not nervous/anxious            Objective:  Vitals: 09/16/20 1209   BP: 126/78   Pulse: 72   Temp: 97 7 °F (36 5 °C)   SpO2: 98%   Weight: 63 8 kg (140 lb 9 6 oz)   Height: 5' 2" (1 575 m)     Body mass index is 25 72 kg/m²  Physical Exam  Vitals signs and nursing note reviewed  Constitutional:       Appearance: She is well-developed  HENT:      Head: Normocephalic and atraumatic  Right Ear: Tympanic membrane, ear canal and external ear normal       Left Ear: Tympanic membrane, ear canal and external ear normal       Nose: Nose normal       Comments: No lesions present on tip of nose     Mouth/Throat:      Pharynx: No oropharyngeal exudate or posterior oropharyngeal erythema  Eyes:      General:         Right eye: Discharge (clear) present  Extraocular Movements: Extraocular movements intact  Conjunctiva/sclera:      Right eye: Right conjunctiva is injected  No exudate  Pupils: Pupils are equal, round, and reactive to light  Comments: Right eyelid is swollen and tender to palpation  No lesions on face or eye  Neck:      Musculoskeletal: Normal range of motion and neck supple  Cardiovascular:      Rate and Rhythm: Normal rate and regular rhythm  Heart sounds: Normal heart sounds  No murmur  No friction rub  No gallop  Pulmonary:      Effort: Pulmonary effort is normal  No respiratory distress  Breath sounds: Normal breath sounds  No wheezing or rales  Musculoskeletal: Normal range of motion  Lymphadenopathy:      Head:      Left side of head: Preauricular and posterior auricular adenopathy present  Cervical: No cervical adenopathy  Skin:     General: Skin is warm and dry  Comments: Few scattered sores on scalp  No drainage  Neurological:      Mental Status: She is alert and oriented to person, place, and time  Psychiatric:         Behavior: Behavior normal          Thought Content:  Thought content normal          Judgment: Judgment normal

## 2020-09-17 ENCOUNTER — DOCTOR'S OFFICE (OUTPATIENT)
Dept: URBAN - NONMETROPOLITAN AREA CLINIC 1 | Facility: CLINIC | Age: 45
Setting detail: OPHTHALMOLOGY
End: 2020-09-17
Payer: COMMERCIAL

## 2020-09-17 ENCOUNTER — RX ONLY (RX ONLY)
Age: 45
End: 2020-09-17

## 2020-09-17 DIAGNOSIS — H10.31: ICD-10-CM

## 2020-09-17 PROCEDURE — 99213 OFFICE O/P EST LOW 20 MIN: CPT | Performed by: OPHTHALMOLOGY

## 2020-09-17 ASSESSMENT — REFRACTION_MANIFEST
OS_ADD: +1.50
OD_VA1: 20/25
OD_SPHERE: PLANO
OS_AXIS: 175
OS_SPHERE: PLANO
OS_CYLINDER: -5.00
OS_VA1: 20/25
OD_CYLINDER: -5.25
OD_ADD: +1.50
OD_AXIS: 005

## 2020-09-17 ASSESSMENT — REFRACTION_CURRENTRX
OS_OVR_VA: 20/
OS_ADD: +1.00
OD_OVR_VA: 20/
OS_SPHERE: PLANO
OS_CYLINDER: -4.75
OD_VPRISM_DIRECTION: PROGS
OD_ADD: +1.00
OD_AXIS: 012
OS_VPRISM_DIRECTION: PROGS
OS_AXIS: 170
OD_CYLINDER: -5.25
OD_SPHERE: -0.25

## 2020-09-17 ASSESSMENT — SPHEQUIV_DERIVED
OD_SPHEQUIV: -2.125
OS_SPHEQUIV: -2.5

## 2020-09-17 ASSESSMENT — AXIALLENGTH_DERIVED
OD_AL: 23.9192
OS_AL: 24.4589

## 2020-09-17 ASSESSMENT — KERATOMETRY
OS_AXISANGLE_DEGREES: 088
OS_K2POWER_DIOPTERS: 46.25
OD_K2POWER_DIOPTERS: 47.50
OS_K1POWER_DIOPTERS: 41.50
OD_K1POWER_DIOPTERS: 42.25
OD_AXISANGLE_DEGREES: 095

## 2020-09-17 ASSESSMENT — VISUAL ACUITY
OD_BCVA: 20/20
OS_BCVA: 20/20-1

## 2020-09-17 ASSESSMENT — REFRACTION_AUTOREFRACTION
OS_CYLINDER: -4.50
OD_CYLINDER: -4.75
OD_SPHERE: +0.25
OD_AXIS: 006
OS_SPHERE: -0.25
OS_AXIS: 176

## 2020-09-17 ASSESSMENT — LID EXAM ASSESSMENTS: OD_EDEMA: RUL 1+

## 2020-09-17 ASSESSMENT — CONFRONTATIONAL VISUAL FIELD TEST (CVF)
OS_FINDINGS: FULL
OD_FINDINGS: FULL

## 2020-11-18 DIAGNOSIS — F41.1 GENERALIZED ANXIETY DISORDER: ICD-10-CM

## 2020-11-18 RX ORDER — SERTRALINE HYDROCHLORIDE 25 MG/1
25 TABLET, FILM COATED ORAL DAILY
Qty: 30 TABLET | Refills: 0 | Status: SHIPPED | OUTPATIENT
Start: 2020-11-18 | End: 2022-05-13 | Stop reason: SDUPTHER

## 2021-01-05 ENCOUNTER — OFFICE VISIT (OUTPATIENT)
Dept: FAMILY MEDICINE CLINIC | Facility: CLINIC | Age: 46
End: 2021-01-05
Payer: COMMERCIAL

## 2021-01-05 VITALS
DIASTOLIC BLOOD PRESSURE: 84 MMHG | TEMPERATURE: 97.8 F | OXYGEN SATURATION: 98 % | SYSTOLIC BLOOD PRESSURE: 120 MMHG | BODY MASS INDEX: 25.98 KG/M2 | HEART RATE: 88 BPM | WEIGHT: 141.2 LBS | HEIGHT: 62 IN

## 2021-01-05 DIAGNOSIS — M54.2 NECK PAIN, ACUTE: Primary | ICD-10-CM

## 2021-01-05 DIAGNOSIS — M62.838 MUSCLE SPASMS OF NECK: ICD-10-CM

## 2021-01-05 DIAGNOSIS — Z12.31 ENCOUNTER FOR SCREENING MAMMOGRAM FOR BREAST CANCER: ICD-10-CM

## 2021-01-05 PROCEDURE — 99214 OFFICE O/P EST MOD 30 MIN: CPT | Performed by: PHYSICIAN ASSISTANT

## 2021-01-05 PROCEDURE — 3725F SCREEN DEPRESSION PERFORMED: CPT | Performed by: PHYSICIAN ASSISTANT

## 2021-01-05 RX ORDER — PREDNISONE 10 MG/1
TABLET ORAL
Qty: 30 TABLET | Refills: 0 | Status: SHIPPED | OUTPATIENT
Start: 2021-01-05 | End: 2021-01-17

## 2021-01-05 RX ORDER — CYCLOBENZAPRINE HCL 5 MG
5 TABLET ORAL
Qty: 14 TABLET | Refills: 0 | Status: SHIPPED | OUTPATIENT
Start: 2021-01-05 | End: 2022-05-13

## 2021-01-05 NOTE — PROGRESS NOTES
Assessment/Plan:    Problem List Items Addressed This Visit     None      Visit Diagnoses     Neck pain, acute    -  Primary    Relevant Medications    predniSONE 10 mg tablet    cyclobenzaprine (FLEXERIL) 5 mg tablet    Muscle spasms of neck        Relevant Medications    predniSONE 10 mg tablet    cyclobenzaprine (FLEXERIL) 5 mg tablet    Encounter for screening mammogram for breast cancer        Relevant Orders    Mammo screening bilateral w 3d & cad           Diagnoses and all orders for this visit:    Neck pain, acute  -     predniSONE 10 mg tablet; Take 4 tablets (40 mg total) by mouth daily for 3 days, THEN 3 tablets (30 mg total) daily for 3 days, THEN 2 tablets (20 mg total) daily for 3 days, THEN 1 tablet (10 mg total) daily for 3 days  -     cyclobenzaprine (FLEXERIL) 5 mg tablet; Take 1 tablet (5 mg total) by mouth daily at bedtime as needed for muscle spasms    Muscle spasms of neck  -     predniSONE 10 mg tablet; Take 4 tablets (40 mg total) by mouth daily for 3 days, THEN 3 tablets (30 mg total) daily for 3 days, THEN 2 tablets (20 mg total) daily for 3 days, THEN 1 tablet (10 mg total) daily for 3 days  -     cyclobenzaprine (FLEXERIL) 5 mg tablet; Take 1 tablet (5 mg total) by mouth daily at bedtime as needed for muscle spasms    Encounter for screening mammogram for breast cancer  -     Mammo screening bilateral w 3d & cad; Future        Will place on prednisone taper and give her flexeril to try at bedtime  I explained that flexeril may make her tired and she should not take it prior to driving  Suggested that she continue heat and stretching  She will let us know if symptoms do not improve or worsen  Mammogram ordered    Subjective:      Patient ID: Marga Dumas is a 41 y o  female  Marichuykishan Harris is a pleasant 41year old female who is here today complaining of right sided neck and shoulder pain  The pain started about one week ago   It starts in the right side of her neck and radiates into her arm  She denies any trauma to her neck or shoulder  It is worse with extension or lateral flexion  She has been trying ibuprofen, heat, and ice, but it is not providing relief  She describes the pain as aching, throbbing, and stabbing  The following portions of the patient's history were reviewed and updated as appropriate:   She has no past medical history on file  ,  does not have a problem list on file  ,   has a past surgical history that includes Cholecystectomy;  section; and Sinus surgery  ,  family history includes No Known Problems in her father and mother  ,   reports that she has never smoked  She has never used smokeless tobacco  She reports previous alcohol use  She reports previous drug use ,  has No Known Allergies     Current Outpatient Medications   Medication Sig Dispense Refill    acetaminophen (TYLENOL) 325 mg tablet Take 650 mg by mouth every 6 (six) hours as needed for mild pain      rizatriptan (MAXALT-MLT) 5 MG disintegrating tablet Take 1 tablet (5 mg total) by mouth once as needed for migraine for up to 1 dose May repeat in 2 hours if needed 20 tablet 0    sertraline (ZOLOFT) 25 mg tablet Take 1 tablet (25 mg total) by mouth daily 30 tablet 0    acyclovir (ZOVIRAX) 800 mg tablet Take 1 tablet (800 mg total) by mouth every 4 (four) hours while awake for 10 days (Patient not taking: Reported on 2021) 50 tablet 0    cyclobenzaprine (FLEXERIL) 5 mg tablet Take 1 tablet (5 mg total) by mouth daily at bedtime as needed for muscle spasms 14 tablet 0    predniSONE 10 mg tablet Take 4 tablets (40 mg total) by mouth daily for 3 days, THEN 3 tablets (30 mg total) daily for 3 days, THEN 2 tablets (20 mg total) daily for 3 days, THEN 1 tablet (10 mg total) daily for 3 days  30 tablet 0     No current facility-administered medications for this visit  Review of Systems   Constitutional: Negative for chills, diaphoresis, fatigue and fever     HENT: Negative for congestion, ear pain, postnasal drip, rhinorrhea, sneezing, sore throat and trouble swallowing  Eyes: Negative for pain and visual disturbance  Respiratory: Negative for apnea, cough, shortness of breath and wheezing  Cardiovascular: Negative for chest pain and palpitations  Gastrointestinal: Negative for abdominal pain, constipation, diarrhea, nausea and vomiting  Genitourinary: Negative for dysuria and hematuria  Musculoskeletal: Positive for arthralgias, myalgias and neck pain  Negative for gait problem  Neurological: Negative for dizziness, syncope, weakness, light-headedness, numbness and headaches  Psychiatric/Behavioral: Negative for suicidal ideas  The patient is not nervous/anxious  Objective:  Vitals:    01/05/21 1254   BP: 120/84   Pulse: 88   Temp: 97 8 °F (36 6 °C)   SpO2: 98%   Weight: 64 kg (141 lb 3 2 oz)   Height: 5' 2" (1 575 m)     Body mass index is 25 83 kg/m²  Physical Exam  Vitals signs and nursing note reviewed  Constitutional:       Appearance: She is well-developed  HENT:      Head: Normocephalic and atraumatic  Right Ear: External ear normal       Left Ear: External ear normal    Eyes:      Extraocular Movements: Extraocular movements intact  Neck:      Musculoskeletal: Normal range of motion and neck supple  Cardiovascular:      Rate and Rhythm: Normal rate and regular rhythm  Heart sounds: Normal heart sounds  No murmur  No friction rub  No gallop  Pulmonary:      Effort: Pulmonary effort is normal  No respiratory distress  Breath sounds: Normal breath sounds  No wheezing or rales  Musculoskeletal:      Right shoulder: She exhibits normal range of motion, no tenderness, no pain and no spasm  Right elbow: She exhibits normal range of motion and no swelling  No tenderness found  Right wrist: Normal  She exhibits normal range of motion, no tenderness and no swelling        Cervical back: She exhibits decreased range of motion (secondary to pain), tenderness (right sided paraspinal muscles and trapezius muslce), pain and spasm  She exhibits no swelling and no deformity  Comments: Lateral rotation and extension of the neck exacerbate pain  Decreased ROM secondary to pain and spasm   Lymphadenopathy:      Cervical: No cervical adenopathy  Skin:     General: Skin is warm and dry  Neurological:      Mental Status: She is alert and oriented to person, place, and time  Psychiatric:         Behavior: Behavior normal          Thought Content:  Thought content normal          Judgment: Judgment normal

## 2021-01-13 ENCOUNTER — TELEPHONE (OUTPATIENT)
Dept: FAMILY MEDICINE CLINIC | Facility: CLINIC | Age: 46
End: 2021-01-13

## 2021-01-13 DIAGNOSIS — M62.838 MUSCLE SPASMS OF NECK: ICD-10-CM

## 2021-01-13 DIAGNOSIS — M54.2 NECK PAIN, ACUTE: Primary | ICD-10-CM

## 2021-01-13 NOTE — TELEPHONE ENCOUNTER
I would recommend PT first  Is she opposed to trying some physical therapy? If she is agreeable I will put an order in

## 2021-01-14 ENCOUNTER — TELEPHONE (OUTPATIENT)
Dept: FAMILY MEDICINE CLINIC | Facility: CLINIC | Age: 46
End: 2021-01-14

## 2021-01-14 NOTE — TELEPHONE ENCOUNTER
WOULD LIKE A REFERRAL TO NEUROLOGIST FOR THE PINCHED NERVE IN HER NECK INSTEAD OF GOING TO PHYSICAL THERAPY   CAN YOU PUT ONE IN?

## 2021-01-14 NOTE — TELEPHONE ENCOUNTER
This is not a problem that Neurology typically treats  This would be spine and pain  I can put a referral into spine and pain instead if she would prefer that

## 2021-01-15 DIAGNOSIS — M54.2 NECK PAIN, ACUTE: Primary | ICD-10-CM

## 2021-01-15 DIAGNOSIS — M62.838 MUSCLE SPASMS OF NECK: ICD-10-CM

## 2021-01-25 ENCOUNTER — CONSULT (OUTPATIENT)
Dept: PAIN MEDICINE | Facility: CLINIC | Age: 46
End: 2021-01-25
Payer: COMMERCIAL

## 2021-01-25 VITALS
DIASTOLIC BLOOD PRESSURE: 82 MMHG | HEIGHT: 62 IN | BODY MASS INDEX: 26.43 KG/M2 | WEIGHT: 143.6 LBS | SYSTOLIC BLOOD PRESSURE: 120 MMHG

## 2021-01-25 DIAGNOSIS — M54.12 CERVICAL RADICULOPATHY: Primary | ICD-10-CM

## 2021-01-25 DIAGNOSIS — M54.2 NECK PAIN, ACUTE: ICD-10-CM

## 2021-01-25 DIAGNOSIS — M54.16 LUMBAR RADICULOPATHY: ICD-10-CM

## 2021-01-25 DIAGNOSIS — M62.838 MUSCLE SPASMS OF NECK: ICD-10-CM

## 2021-01-25 PROCEDURE — 1036F TOBACCO NON-USER: CPT | Performed by: ANESTHESIOLOGY

## 2021-01-25 PROCEDURE — 3008F BODY MASS INDEX DOCD: CPT | Performed by: ANESTHESIOLOGY

## 2021-01-25 PROCEDURE — 99244 OFF/OP CNSLTJ NEW/EST MOD 40: CPT | Performed by: ANESTHESIOLOGY

## 2021-01-25 RX ORDER — METHYLPREDNISOLONE 4 MG/1
TABLET ORAL
Qty: 21 TABLET | Refills: 0 | Status: SHIPPED | OUTPATIENT
Start: 2021-01-25 | End: 2021-03-30 | Stop reason: ALTCHOICE

## 2021-01-25 RX ORDER — GABAPENTIN 100 MG/1
100 CAPSULE ORAL 3 TIMES DAILY
Qty: 90 CAPSULE | Refills: 1 | Status: SHIPPED | OUTPATIENT
Start: 2021-01-25 | End: 2021-02-12 | Stop reason: SDUPTHER

## 2021-01-25 NOTE — PROGRESS NOTES
Assessment:  1  Cervical radiculopathy    2  Neck pain, acute    3  Muscle spasms of neck        Plan:  Patient is a 58-year-old female complaints of neck pain and left arm pain presents office for initial consultation  From patient history patient appears to have most likely a nerve root impingement which would cause of neck pain in the arm pain  At this time we will need to to have diagnostic imaging  She also exhibits exacerbation radicular symptoms with constant numbness tingling burning and shooting pains into the right upper extremity  There is some noted discoloration of the right upper extremity  1  We will order physical therapy cervical spine strengthening exercises  2  We will order x-ray of the cervical spine to better assess the degenerative changes that would correlate patient's current presentation  3  We will trial gabapentin 100 mg p o  T i d  With a titration schedule provided  4  Follow-up in 6 weeks after physical therapy has been completed at which will then reassess most likely order an MRI of the cervical spine  5  We will also order physical therapy for the low back  History of Present Illness: The patient is a 41 y o  female who presents for consultation in regards to Arm Pain, Hand Pain, Neck Pain, and Shoulder Pain  Symptoms have been present for 1 month  Symptoms began without any precipitating injury or trauma  Pain is reported to be 6 on the numeric rating scale  Symptoms are felt nearly constantly and worst in the morning, afternoon, evening, nighttime  Symptoms are characterized as shooting, sharp, dull/aching and pressure-like  Symptoms are associated with bilateral arm weakness  Aggravating factors include lying down, standing, bending, leaning forward, leaning bckward, sitting, walking, exercise, coughing/sneezing and bowel movements  Relieving factors include nothing  No change in symptoms with relaxation  Treatments that have been helpful include nothing  home exercise and heat/ice have provided no relief  Medications to relieve symptoms include none  Review of Systems:    Review of Systems   Neurological: Positive for headaches  Psychiatric/Behavioral: The patient is nervous/anxious  All other systems reviewed and are negative  No past medical history on file      Past Surgical History:   Procedure Laterality Date     SECTION      CHOLECYSTECTOMY      SINUS SURGERY         Family History   Problem Relation Age of Onset    No Known Problems Mother     No Known Problems Father        Social History     Occupational History    Not on file   Tobacco Use    Smoking status: Never Smoker    Smokeless tobacco: Never Used   Substance and Sexual Activity    Alcohol use: Not Currently    Drug use: Not Currently    Sexual activity: Not on file         Current Outpatient Medications:     acetaminophen (TYLENOL) 325 mg tablet, Take 650 mg by mouth every 6 (six) hours as needed for mild pain, Disp: , Rfl:     acyclovir (ZOVIRAX) 800 mg tablet, Take 1 tablet (800 mg total) by mouth every 4 (four) hours while awake for 10 days (Patient not taking: Reported on 2021), Disp: 50 tablet, Rfl: 0    cyclobenzaprine (FLEXERIL) 5 mg tablet, Take 1 tablet (5 mg total) by mouth daily at bedtime as needed for muscle spasms, Disp: 14 tablet, Rfl: 0    rizatriptan (MAXALT-MLT) 5 MG disintegrating tablet, Take 1 tablet (5 mg total) by mouth once as needed for migraine for up to 1 dose May repeat in 2 hours if needed, Disp: 20 tablet, Rfl: 0    sertraline (ZOLOFT) 25 mg tablet, Take 1 tablet (25 mg total) by mouth daily, Disp: 30 tablet, Rfl: 0    No Known Allergies    Physical Exam:    /82 (BP Location: Left arm, Patient Position: Sitting, Cuff Size: Standard)   Ht 5' 2" (1 575 m)   Wt 65 1 kg (143 lb 9 6 oz)   BMI 26 26 kg/m²     Constitutional: normal, well developed, well nourished, alert, in no distress and non-toxic and no overt pain behavior  and obese  Eyes: anicteric  HEENT: grossly intact  Neck: supple, symmetric, trachea midline and no masses   Pulmonary:even and unlabored  Cardiovascular:No edema or pitting edema present  Skin:Normal without rashes or lesions and well hydrated  Psychiatric:Mood and affect appropriate  Neurologic:Cranial Nerves II-XII grossly intact  Musculoskeletal:antalgic     Cervical Spine examination demonstrates  Decreased ROM secondary to pain with lateral rotation to the left/right and bending to the left/right, in addition to neck flexion  4/5 upper extremity strength in all muscle groups bilaterally  Negative Spurling's maneuver to the b/l Ue, sensitivity to light touch intact b/l Ue  Imaging  No orders to display       No orders of the defined types were placed in this encounter

## 2021-01-25 NOTE — PATIENT INSTRUCTIONS

## 2021-01-28 ENCOUNTER — EVALUATION (OUTPATIENT)
Dept: PHYSICAL THERAPY | Facility: CLINIC | Age: 46
End: 2021-01-28
Payer: COMMERCIAL

## 2021-01-28 DIAGNOSIS — M54.12 CERVICAL RADICULOPATHY: ICD-10-CM

## 2021-01-28 DIAGNOSIS — M54.16 LUMBAR RADICULOPATHY: ICD-10-CM

## 2021-01-28 DIAGNOSIS — M54.2 NECK PAIN, ACUTE: Primary | ICD-10-CM

## 2021-01-28 DIAGNOSIS — M62.838 MUSCLE SPASMS OF NECK: ICD-10-CM

## 2021-01-28 PROCEDURE — 97535 SELF CARE MNGMENT TRAINING: CPT | Performed by: PHYSICAL THERAPIST

## 2021-01-28 PROCEDURE — 97162 PT EVAL MOD COMPLEX 30 MIN: CPT | Performed by: PHYSICAL THERAPIST

## 2021-01-28 NOTE — PROGRESS NOTES
PT Evaluation     Today's date: 2021  Patient name: Cresencio Colon  : 1975  MRN: [id number]  Referring provider: Juanito Quiñones MD  Dx:   Encounter Diagnosis     ICD-10-CM    1  Neck pain, acute  M54 2 Ambulatory referral to Physical Therapy   2  Muscle spasms of neck  M62 838 Ambulatory referral to Physical Therapy   3  Cervical radiculopathy  M54 12 Ambulatory referral to Physical Therapy   4  Lumbar radiculopathy  M54 16 Ambulatory referral to Physical Therapy                  Assessment  Assessment details: Patient is a 41year old female who presents to PT with c/o pain, numbness and weakness t/o cervical spine and right UE limiting functional ability  PT examination shows limitations including weakness, paresthesias, decreased posture, limited rom and increased pain limiting functional ability  Patient would benefit from PT intervention including exercises for strengthening, rom, and stability, postural training, manual therapy, HEP, functional training, aerobic conditioning and pain relieving modalities in order to maximize functional independence  Impairments: abnormal muscle tone, abnormal or restricted ROM, activity intolerance, impaired physical strength, lacks appropriate home exercise program, pain with function and poor posture     Goals  ST  Initiate HEP  2  Patient to report decreased pain at worst by 25% in 3 weeks    LT  Patient to increase UE and cervical strength to [facility] in 6 weeks  2   Patient to report decreased pain at worst by 50-75% in 6 weeks    Plan  Patient would benefit from: PT eval and skilled physical therapy  Planned modality interventions: cryotherapy, thermotherapy: hydrocollator packs, ultrasound and unattended electrical stimulation  Other planned modality interventions: Modalities PRN  Planned therapy interventions: IADL retraining, ADL retraining, joint mobilization, manual therapy, massage, neuromuscular re-education, patient education, postural training, strengthening, stretching, therapeutic activities, therapeutic exercise, therapeutic training, functional ROM exercises, flexibility, graded activity, graded exercise and home exercise program  Frequency: 2x week  Duration in visits: 8  Duration in weeks: 4  Treatment plan discussed with: patient        Subjective Evaluation    History of Present Illness  Date of onset: 2020  Mechanism of injury: Patient presents to PT with c/o pain and numbness in cervical spine and right UE  Patient reports the pain began about a month ago without known injury  Patient states she was seen by spine specialist on Monday and think she has a pinched nerve  Patient reports constant numbness and tingling along with increased pain and weakness t/o right UE  Patient reports feeling shooting pain into right UE when she tilts head back and to the right  She reports difficulty with driving and reaching  Patient is to trial PT and returns to MD in 6 weeks for possible MRI referral  Patient is now referred to oppt  Pain  Current pain ratin  At best pain rating: 3  At worst pain rating: 10  Quality: Shooting  Progression: worsening    Patient Goals  Patient goal: To get an MRI        Objective     Concurrent Complaints  Negative for night pain, disturbed sleep, dizziness, faints, headaches, nausea/motion sickness, tinnitus, trouble swallowing, difficulty breathing, shortness of breath, respiratory pain, visual change, cardiac problem, kidney problem, gallbladder problem, stomach problem, ulcer, appendix problem, spleen problem, pancreas problem, history of cancer, history of trauma and infection    Static Posture     Head  Forward  Shoulders  Rounded  Tenderness   Cervical Spine   Tenderness in the facet joint, spinous process and right transverse process       Neurological Testing     Sensation   Cervical/Thoracic   Left   Intact: light touch    Right   Diminished: light touch    Additional Neurological Details  Paresthesias in right UE C5, C6, C7 dermatome areas    Active Range of Motion   Cervical/Thoracic Spine       Cervical    Flexion:  WFL and with pain  Extension:  with pain Restriction level: minimal  Left lateral flexion:  WFL  Right lateral flexion:  with pain Restriction level moderate  Left rotation:  WFL  Right rotation:  [facility]    Joint Play     Hypomobile: C4, C5, C6 and C7     Pain: C3, C4, C5, C6 and C7     Strength/Myotome Testing     Left Shoulder     Planes of Motion   Flexion: 4   Abduction: 4   External rotation at 0°: 4   Internal rotation at 0°: 4     Right Shoulder     Planes of Motion   Flexion: 4   Abduction: 4   External rotation at 0°: 4   Internal rotation at 0°: 4     Left Elbow   Flexion: 4  Extension: 4    Right Elbow   Flexion: 4  Extension: 4    Tests   Cervical   Negative lumbar distraction  Left   Negative Spurling's Test A  Right   Negative Spurling's Test A       General Comments:    Upper quarter screen   Shoulder: unremarkable  Elbow: unremarkable  Hand/wrist: unremarkable  Neuro Exam:     Headaches   Patient reports headaches: No               Precautions: None                Manuals 1/28/21       Cervical Stretching, Mobs                                Neuro Re-Ed         Chin Tucks        Retractions        MTP/LTP        Shrugs        Scaption        Serratus Punch                Ther Ex        UBE        Supine Cane fleion        Self UT and LS stretch                                                Ther Activity                        Gait Training                        Modalities

## 2021-02-02 ENCOUNTER — APPOINTMENT (OUTPATIENT)
Dept: PHYSICAL THERAPY | Facility: CLINIC | Age: 46
End: 2021-02-02
Payer: COMMERCIAL

## 2021-02-04 ENCOUNTER — OFFICE VISIT (OUTPATIENT)
Dept: PHYSICAL THERAPY | Facility: CLINIC | Age: 46
End: 2021-02-04
Payer: COMMERCIAL

## 2021-02-04 DIAGNOSIS — M54.16 LUMBAR RADICULOPATHY: ICD-10-CM

## 2021-02-04 DIAGNOSIS — M54.2 NECK PAIN, ACUTE: Primary | ICD-10-CM

## 2021-02-04 DIAGNOSIS — M54.12 CERVICAL RADICULOPATHY: ICD-10-CM

## 2021-02-04 DIAGNOSIS — M62.838 MUSCLE SPASMS OF NECK: ICD-10-CM

## 2021-02-04 PROCEDURE — 97112 NEUROMUSCULAR REEDUCATION: CPT | Performed by: PHYSICAL THERAPIST

## 2021-02-04 PROCEDURE — 97110 THERAPEUTIC EXERCISES: CPT | Performed by: PHYSICAL THERAPIST

## 2021-02-04 PROCEDURE — 97140 MANUAL THERAPY 1/> REGIONS: CPT | Performed by: PHYSICAL THERAPIST

## 2021-02-04 NOTE — PROGRESS NOTES
Daily Note     Today's date: 2021  Patient name: Edilma Connell  : 1975  MRN: [id number]  Referring provider: Barb Baum MD  Dx:   Encounter Diagnosis     ICD-10-CM    1  Neck pain, acute  M54 2    2  Muscle spasms of neck  M62 838    3  Cervical radiculopathy  M54 12    4  Lumbar radiculopathy  M54 16                   Subjective: "I'm no different  The chin tucks are the worst  The pins and needles are nerve-wracking"  Objective: See treatment diary below  Manuals 21      Cervical Stretching, Mobs  15 min                              Neuro Re-Ed         Chin Tucks  10x      Retractions  2# 2x10      MTP/LTP  RTB 2x10 each      Shrugs  2# 2x10      Scaption        Serratus Punch                Ther Ex        UBE  5 min      Supine Cane fleion  2x10      Self UT and LS stretch                                                Ther Activity                        Gait Training                        Modalities                            Assessment: Tolerated treatment fair  Patient exhibited good technique with therapeutic exercises  Minimal VC's required for correct performance of TE  No difference in paresthesias with manual therapy  TTP at right c4-c6 levels  Plan: Continue per plan of care        Precautions: None

## 2021-02-09 ENCOUNTER — APPOINTMENT (OUTPATIENT)
Dept: PHYSICAL THERAPY | Facility: CLINIC | Age: 46
End: 2021-02-09
Payer: COMMERCIAL

## 2021-02-10 ENCOUNTER — OFFICE VISIT (OUTPATIENT)
Dept: PHYSICAL THERAPY | Facility: CLINIC | Age: 46
End: 2021-02-10
Payer: COMMERCIAL

## 2021-02-10 DIAGNOSIS — M54.12 CERVICAL RADICULOPATHY: ICD-10-CM

## 2021-02-10 DIAGNOSIS — M62.838 MUSCLE SPASMS OF NECK: ICD-10-CM

## 2021-02-10 DIAGNOSIS — M54.2 NECK PAIN, ACUTE: Primary | ICD-10-CM

## 2021-02-10 DIAGNOSIS — M54.16 LUMBAR RADICULOPATHY: ICD-10-CM

## 2021-02-10 PROCEDURE — 97110 THERAPEUTIC EXERCISES: CPT | Performed by: PHYSICAL THERAPIST

## 2021-02-10 PROCEDURE — 97112 NEUROMUSCULAR REEDUCATION: CPT | Performed by: PHYSICAL THERAPIST

## 2021-02-10 PROCEDURE — 97140 MANUAL THERAPY 1/> REGIONS: CPT | Performed by: PHYSICAL THERAPIST

## 2021-02-10 NOTE — PROGRESS NOTES
Daily Note     Today's date: 2/10/2021  Patient name: Bob Royal  : 1975  MRN: [id number]  Referring provider: Brian Herrera MD  Dx:   Encounter Diagnosis     ICD-10-CM    1  Neck pain, acute  M54 2    2  Muscle spasms of neck  M62 838    3  Cervical radiculopathy  M54 12    4  Lumbar radiculopathy  M54 16                   Subjective: "There hasn't been any change"  Objective: See treatment diary below  Manuals 1/28/21 2/4/21 2/10/21     Cervical Stretching, Mobs  15 min 15 min                             Neuro Re-Ed         Chin Tucks  10x 10x     Retractions  2# 2x10 2# 2x10     MTP/LTP  RTB 2x10 each RTB 2x10 each     Shrugs  2# 2x10 2# 2x10     Scaption        Serratus Punch                Ther Ex        UBE  5 min 5 min     Supine Cane fleion  2x10 2x10     Self UT and LS stretch        Elbow Flexion   2# 2x10                                     Ther Activity                        Gait Training                        Modalities                            Assessment: Tolerated treatment fair  Patient exhibited good technique with therapeutic exercises  Pain with palpation at right C4-C5, C5-C6 levels  Decreased motion noted at these levels  No change in symptoms with mobilizations, STM, distraction or stretching today  Will continue with PT  Patient will call MD after NV to see if any further treatment or diagnostic procedures can be done  Plan: Continue per plan of care        Precautions: None

## 2021-02-11 ENCOUNTER — OFFICE VISIT (OUTPATIENT)
Dept: PHYSICAL THERAPY | Facility: CLINIC | Age: 46
End: 2021-02-11
Payer: COMMERCIAL

## 2021-02-11 DIAGNOSIS — M54.2 NECK PAIN, ACUTE: Primary | ICD-10-CM

## 2021-02-11 PROCEDURE — 97112 NEUROMUSCULAR REEDUCATION: CPT

## 2021-02-11 PROCEDURE — 97140 MANUAL THERAPY 1/> REGIONS: CPT

## 2021-02-11 PROCEDURE — 97110 THERAPEUTIC EXERCISES: CPT

## 2021-02-11 NOTE — PROGRESS NOTES
Daily Note     Today's date: 2021  Patient name: Rosella Lanes  : 1975  MRN: [id number]  Referring provider: Zunilda Dyer MD  Dx:   Encounter Diagnosis     ICD-10-CM    1  Neck pain, acute  M54 2                   Subjective: Patient reports no change in pain or symptoms in C/S  Patient reports will call MD today  Objective: See treatment diary below      Assessment: Tolerated treatment well  Patient continued with pain at C4-5  Patient unable to progress secondary to pain  Plan: Continue per plan of care        Precautions: None    Manuals 1/28/21 2/4/21 2/10/21 2/11/21    Cervical Stretching, Mobs  15 min 15 min 15 min                            Neuro Re-Ed         Chin Tucks  10x 10x 10x    Retractions  2# 2x10 2# 2x10 2# 2x10    MTP/LTP  RTB 2x10 each RTB 2x10 each RTB 2x10 each    Shrugs  2# 2x10 2# 2x10 2# 2x10    Scaption        Serratus Punch                Ther Ex        UBE  5 min 5 min 5 min    Supine Cane fleion  2x10 2x10 2x10    Self UT and LS stretch        Elbow Flexion   2# 2x10 2# 2x10                                    Ther Activity                        Gait Training                        Modalities

## 2021-02-12 ENCOUNTER — TELEPHONE (OUTPATIENT)
Dept: PAIN MEDICINE | Facility: CLINIC | Age: 46
End: 2021-02-12

## 2021-02-12 DIAGNOSIS — M62.838 MUSCLE SPASMS OF NECK: ICD-10-CM

## 2021-02-12 DIAGNOSIS — M54.12 CERVICAL RADICULOPATHY: Primary | ICD-10-CM

## 2021-02-12 DIAGNOSIS — M54.2 NECK PAIN, ACUTE: ICD-10-CM

## 2021-02-12 RX ORDER — GABAPENTIN 300 MG/1
300 CAPSULE ORAL 3 TIMES DAILY
Qty: 90 CAPSULE | Refills: 1 | Status: SHIPPED | OUTPATIENT
Start: 2021-02-12 | End: 2022-05-13

## 2021-02-12 NOTE — TELEPHONE ENCOUNTER
Patient called requesting to have a MRI done for her cervical & right arm pain  She states therapy is not helping   Please advise, jin    Call back# [phone]

## 2021-02-12 NOTE — TELEPHONE ENCOUNTER
RN s/w pt regarding  previous  Per pt she is on 100 mg gabapentin TID no side effects and also no relief  Taking ibuprofen 800 mg BID has to watch how much ibuprofen because it irritates her bladder  Pt does take tylenol and aware may take 1000 mg up to TID  Pt would like an MRI of her neck her arm pain/tingling feels worse since going to PT  Has gone 4 times already and had another appt on Tuesday,  Pt aware that insurance companies many times require 6 weeks of PT  Pt is also going to get xray that RM ordered has not been able to get out ot get same due to weather  --please advise thank you--  MRI?  PT is aggravating her pain and N/T  Pt to see BK 3/8

## 2021-02-12 NOTE — TELEPHONE ENCOUNTER
Can by over the counter preparation H  Or could try cream instead of suppository order pended   RN s/w pt and advised of same  Pt verbalized understanding  Central scheduling contact info provided  Pt appreciative

## 2021-02-12 NOTE — TELEPHONE ENCOUNTER
I placed an order for the MRI  we will increase gabapentin as follows:  Please advise her to take 2 pills 3 times daily for the next 5 days, then will increase to 300 mg 3 times daily  I sent a new prescription to her pharmacy for 300 mg  Capsules

## 2021-02-16 ENCOUNTER — APPOINTMENT (OUTPATIENT)
Dept: PHYSICAL THERAPY | Facility: CLINIC | Age: 46
End: 2021-02-16
Payer: COMMERCIAL

## 2021-02-18 ENCOUNTER — HOSPITAL ENCOUNTER (OUTPATIENT)
Dept: MRI IMAGING | Facility: HOSPITAL | Age: 46
Discharge: HOME | End: 2021-02-18
Payer: COMMERCIAL

## 2021-02-18 ENCOUNTER — APPOINTMENT (OUTPATIENT)
Dept: PHYSICAL THERAPY | Facility: CLINIC | Age: 46
End: 2021-02-18
Payer: COMMERCIAL

## 2021-02-18 DIAGNOSIS — M54.12 CERVICAL RADICULOPATHY: ICD-10-CM

## 2021-02-18 DIAGNOSIS — M54.2 NECK PAIN, ACUTE: ICD-10-CM

## 2021-02-18 PROCEDURE — G1004 CDSM NDSC: HCPCS

## 2021-02-18 PROCEDURE — 72141 MRI NECK SPINE W/O DYE: CPT

## 2021-02-19 ENCOUNTER — TELEPHONE (OUTPATIENT)
Dept: PAIN MEDICINE | Facility: MEDICAL CENTER | Age: 46
End: 2021-02-19

## 2021-02-19 NOTE — TELEPHONE ENCOUNTER
----- Message from Chago Chow, [address] Chanelle Garsia sent at 2/19/2021  2:56 PM EST -----  Please call patient and let her know that the MRI of her cervical spine reveals a disc bulge at C5-6 resulting in lateral recess narrowing,  right greater than left  There is also a minor disc bulge at C6-7 with mild bilateral foraminal narrowing  She would be candidate for an epidural steroid injection  Please advise her to keep her scheduled follow-up visit so we can discuss this in greater detail

## 2021-02-23 ENCOUNTER — OFFICE VISIT (OUTPATIENT)
Dept: PAIN MEDICINE | Facility: CLINIC | Age: 46
End: 2021-02-23
Payer: COMMERCIAL

## 2021-02-23 ENCOUNTER — APPOINTMENT (OUTPATIENT)
Dept: PHYSICAL THERAPY | Facility: CLINIC | Age: 46
End: 2021-02-23
Payer: COMMERCIAL

## 2021-02-23 VITALS
WEIGHT: 146 LBS | DIASTOLIC BLOOD PRESSURE: 82 MMHG | BODY MASS INDEX: 26.87 KG/M2 | HEIGHT: 62 IN | SYSTOLIC BLOOD PRESSURE: 126 MMHG

## 2021-02-23 DIAGNOSIS — M54.12 CERVICAL RADICULOPATHY: Primary | ICD-10-CM

## 2021-02-23 DIAGNOSIS — M48.02 CERVICAL SPINAL STENOSIS: ICD-10-CM

## 2021-02-23 DIAGNOSIS — M54.16 LUMBAR RADICULOPATHY: Primary | ICD-10-CM

## 2021-02-23 DIAGNOSIS — M54.16 LUMBAR RADICULOPATHY: ICD-10-CM

## 2021-02-23 PROCEDURE — 99214 OFFICE O/P EST MOD 30 MIN: CPT | Performed by: ANESTHESIOLOGY

## 2021-02-23 NOTE — PROGRESS NOTES
Assessment:  1  Cervical radiculopathy    2  Cervical spinal stenosis    3  Lumbar radiculopathy        Plan:  Patient is a 67-year-old female complaints of neck pain, right upper extremity pain chronic pain syndrome secondary to cervical radiculopathy presents office for follow-up visit  MRI lumbar spine was reviewed which shows C5-C6 disc bulge with right lateral recess stenosis and mild bilateral foraminal stenosis  Patient also reports difficulty standing erect from the kneeling position or from lying down which will cause a numbness and tingling sensation to bilateral lower extremities  Patient does report having back pain for period of time however she notes her symptoms frequency and intensity are increasing as the months have been passing by   1  We will schedule patient for  Right C7-T1 interlaminar epidural steroid injection  2  Follow-up 1 month after injection  3  We will order an x-ray of the lumbar spine to better assess the discogenic pathology correlate patient's lower extremity pain and weakness  4  We will provide patient with a physical therapy script for lumbar spine strengthening exercises    Complete risks and benefits including bleeding, infection, tissue reaction, nerve injury and allergic reaction were discussed  The approach was demonstrated using models and literature was provided  Verbal and written consent was obtained  History of Present Illness: The patient is a 41 y o  female who presents for a follow up office visit in regards to Neck Pain, Arm Pain, and Elbow Pain  The patients current symptoms include  6/10 intermittent dull/aching, shooting, numbness without any particular time pattern  Current pain medications includes:   Gabapentin,  Flexeril  The patient reports that this regimen is providing 0% pain relief  The patient is reporting no side effects from this pain medication regimen      I have personally reviewed and/or updated the patient's past medical history, past surgical history, family history, social history, current medications, allergies, and vital signs today  Review of Systems  Review of Systems   Constitutional: Positive for unexpected weight change  Gastrointestinal: Positive for nausea  All other systems reviewed and are negative  No past medical history on file      Past Surgical History:   Procedure Laterality Date     SECTION      CHOLECYSTECTOMY      SINUS SURGERY         Family History   Problem Relation Age of Onset    No Known Problems Mother     No Known Problems Father        Social History     Occupational History    Not on file   Tobacco Use    Smoking status: Never Smoker    Smokeless tobacco: Never Used   Substance and Sexual Activity    Alcohol use: Not Currently    Drug use: Not Currently    Sexual activity: Not on file         Current Outpatient Medications:     acetaminophen (TYLENOL) 325 mg tablet, Take 650 mg by mouth every 6 (six) hours as needed for mild pain, Disp: , Rfl:     acyclovir (ZOVIRAX) 800 mg tablet, Take 1 tablet (800 mg total) by mouth every 4 (four) hours while awake for 10 days (Patient not taking: Reported on 2021), Disp: 50 tablet, Rfl: 0    cyclobenzaprine (FLEXERIL) 5 mg tablet, Take 1 tablet (5 mg total) by mouth daily at bedtime as needed for muscle spasms, Disp: 14 tablet, Rfl: 0    gabapentin (NEURONTIN) 300 mg capsule, Take 1 capsule (300 mg total) by mouth 3 (three) times a day, Disp: 90 capsule, Rfl: 1    methylPREDNISolone 4 MG tablet therapy pack, Use as directed on package, Disp: 21 tablet, Rfl: 0    rizatriptan (MAXALT-MLT) 5 MG disintegrating tablet, Take 1 tablet (5 mg total) by mouth once as needed for migraine for up to 1 dose May repeat in 2 hours if needed, Disp: 20 tablet, Rfl: 0    sertraline (ZOLOFT) 25 mg tablet, Take 1 tablet (25 mg total) by mouth daily, Disp: 30 tablet, Rfl: 0    No Known Allergies    Physical Exam:    /82   Ht 5' 2" (1 575 m)   Wt 66 2 kg (146 lb)   BMI 26 70 kg/m²     Constitutional:normal, well developed, well nourished, alert, in no distress and non-toxic and no overt pain behavior  Eyes:anicteric  HEENT:grossly intact  Neck:supple, symmetric, trachea midline and no masses   Pulmonary:even and unlabored  Cardiovascular:No edema or pitting edema present  Skin:Normal without rashes or lesions and well hydrated  Psychiatric:Mood and affect appropriate  Neurologic:Cranial Nerves II-XII grossly intact  Musculoskeletal:normal      Cervical Spine examination demonstrates  Decreased ROM secondary to pain with lateral rotation to the left/right and bending to the left/right, in addition to neck flexion  4/5  Right upper extremity strength in all muscle groups  Negative Spurling's maneuver to the b/l Ue, sensitivity to light touch intact b/l Ue  Lumbar/Sacral Spine examination demonstrates  Decrease range of motion lumbar spine with pain upon: flexion, lateral rotation to the left/right, and bending to the left/right  Bilateral lumbar paraspinals tender to palpation  Muscle spasms noted in the lumbar area bilaterally  4/5 lower extremity strength in all muscle groups bilaterally  Positive seated straight leg raise for bilateral lower extremities  Sensitivity to light touch intact bilateral lower extremities  2+ reflexes in the patella and Achilles  No ankle clonus       Imaging  MRI CERVICAL SPINE WITHOUT CONTRAST     INDICATION: M54 12: Radiculopathy, cervical region  M54 2: Cervicalgia      COMPARISON:  X-ray cervical spine 12/13/2006     TECHNIQUE:  Sagittal T1, sagittal T2, sagittal inversion recovery, axial T2, axial  2D merge     IMAGE QUALITY:  Diagnostic     FINDINGS:     ALIGNMENT:  There is straightening of normal cervical lordosis  No spondylolisthesis      MARROW SIGNAL:  Normal marrow signal is identified within the visualized bony structures    No discrete marrow lesion      CERVICAL AND VISUALIZED THORACIC CORD:  Normal signal within the visualized cord      PREVERTEBRAL AND PARASPINAL SOFT TISSUES:  Normal      VISUALIZED POSTERIOR FOSSA:  The visualized posterior fossa demonstrates no abnormal signal      CERVICAL DISC SPACES:     C2-C3:  Unremarkable     C3-C4:  Unremarkable     C4-C5:  Unremarkable     C5-C6:  There is disc bulge and superimposed right greater than left bilateral foraminal disc protrusion with mild uncovertebral spurring  There is right greater than left lateral recesses narrowing and mild canal stenosis  There is mild to moderate   bilateral foraminal narrowing      C6-C7:  There is mild disc bulge and uncovertebral spurring resulting in mild canal and mild bilateral foraminal narrowing      C7-T1:  Unremarkable     UPPER THORACIC DISC SPACES:  Normal      IMPRESSION:     1  Disc bulge and superimposed right greater than left bilateral foraminal disc protrusion at C5-6 resulting in right greater than left lateral recesses narrowing without significant canal stenosis  Mild to moderate bilateral foraminal narrowing  Correlate for C6 radiculopathy      2   Mild canal and mild bilateral foraminal narrowing at C6-7  No orders of the defined types were placed in this encounter

## 2021-02-23 NOTE — H&P (VIEW-ONLY)
Assessment:  1  Cervical radiculopathy    2  Cervical spinal stenosis    3  Lumbar radiculopathy        Plan:  Patient is a 23-year-old female complaints of neck pain, right upper extremity pain chronic pain syndrome secondary to cervical radiculopathy presents office for follow-up visit  MRI lumbar spine was reviewed which shows C5-C6 disc bulge with right lateral recess stenosis and mild bilateral foraminal stenosis  Patient also reports difficulty standing erect from the kneeling position or from lying down which will cause a numbness and tingling sensation to bilateral lower extremities  Patient does report having back pain for period of time however she notes her symptoms frequency and intensity are increasing as the months have been passing by   1  We will schedule patient for  Right C7-T1 interlaminar epidural steroid injection  2  Follow-up 1 month after injection  3  We will order an x-ray of the lumbar spine to better assess the discogenic pathology correlate patient's lower extremity pain and weakness  4  We will provide patient with a physical therapy script for lumbar spine strengthening exercises    Complete risks and benefits including bleeding, infection, tissue reaction, nerve injury and allergic reaction were discussed  The approach was demonstrated using models and literature was provided  Verbal and written consent was obtained  History of Present Illness: The patient is a 41 y o  female who presents for a follow up office visit in regards to Neck Pain, Arm Pain, and Elbow Pain  The patients current symptoms include  6/10 intermittent dull/aching, shooting, numbness without any particular time pattern  Current pain medications includes:   Gabapentin,  Flexeril  The patient reports that this regimen is providing 0% pain relief  The patient is reporting no side effects from this pain medication regimen      I have personally reviewed and/or updated the patient's past medical history, past surgical history, family history, social history, current medications, allergies, and vital signs today  Review of Systems  Review of Systems   Constitutional: Positive for unexpected weight change  Gastrointestinal: Positive for nausea  All other systems reviewed and are negative  No past medical history on file      Past Surgical History:   Procedure Laterality Date     SECTION      CHOLECYSTECTOMY      SINUS SURGERY         Family History   Problem Relation Age of Onset    No Known Problems Mother     No Known Problems Father        Social History     Occupational History    Not on file   Tobacco Use    Smoking status: Never Smoker    Smokeless tobacco: Never Used   Substance and Sexual Activity    Alcohol use: Not Currently    Drug use: Not Currently    Sexual activity: Not on file         Current Outpatient Medications:     acetaminophen (TYLENOL) 325 mg tablet, Take 650 mg by mouth every 6 (six) hours as needed for mild pain, Disp: , Rfl:     acyclovir (ZOVIRAX) 800 mg tablet, Take 1 tablet (800 mg total) by mouth every 4 (four) hours while awake for 10 days (Patient not taking: Reported on 2021), Disp: 50 tablet, Rfl: 0    cyclobenzaprine (FLEXERIL) 5 mg tablet, Take 1 tablet (5 mg total) by mouth daily at bedtime as needed for muscle spasms, Disp: 14 tablet, Rfl: 0    gabapentin (NEURONTIN) 300 mg capsule, Take 1 capsule (300 mg total) by mouth 3 (three) times a day, Disp: 90 capsule, Rfl: 1    methylPREDNISolone 4 MG tablet therapy pack, Use as directed on package, Disp: 21 tablet, Rfl: 0    rizatriptan (MAXALT-MLT) 5 MG disintegrating tablet, Take 1 tablet (5 mg total) by mouth once as needed for migraine for up to 1 dose May repeat in 2 hours if needed, Disp: 20 tablet, Rfl: 0    sertraline (ZOLOFT) 25 mg tablet, Take 1 tablet (25 mg total) by mouth daily, Disp: 30 tablet, Rfl: 0    No Known Allergies    Physical Exam:    /82   Ht 5' 2" (1 575 m)   Wt 66 2 kg (146 lb)   BMI 26 70 kg/m²     Constitutional:normal, well developed, well nourished, alert, in no distress and non-toxic and no overt pain behavior  Eyes:anicteric  HEENT:grossly intact  Neck:supple, symmetric, trachea midline and no masses   Pulmonary:even and unlabored  Cardiovascular:No edema or pitting edema present  Skin:Normal without rashes or lesions and well hydrated  Psychiatric:Mood and affect appropriate  Neurologic:Cranial Nerves II-XII grossly intact  Musculoskeletal:normal      Cervical Spine examination demonstrates  Decreased ROM secondary to pain with lateral rotation to the left/right and bending to the left/right, in addition to neck flexion  4/5  Right upper extremity strength in all muscle groups  Negative Spurling's maneuver to the b/l Ue, sensitivity to light touch intact b/l Ue  Lumbar/Sacral Spine examination demonstrates  Decrease range of motion lumbar spine with pain upon: flexion, lateral rotation to the left/right, and bending to the left/right  Bilateral lumbar paraspinals tender to palpation  Muscle spasms noted in the lumbar area bilaterally  4/5 lower extremity strength in all muscle groups bilaterally  Positive seated straight leg raise for bilateral lower extremities  Sensitivity to light touch intact bilateral lower extremities  2+ reflexes in the patella and Achilles  No ankle clonus       Imaging  MRI CERVICAL SPINE WITHOUT CONTRAST     INDICATION: M54 12: Radiculopathy, cervical region  M54 2: Cervicalgia      COMPARISON:  X-ray cervical spine 12/13/2006     TECHNIQUE:  Sagittal T1, sagittal T2, sagittal inversion recovery, axial T2, axial  2D merge     IMAGE QUALITY:  Diagnostic     FINDINGS:     ALIGNMENT:  There is straightening of normal cervical lordosis  No spondylolisthesis      MARROW SIGNAL:  Normal marrow signal is identified within the visualized bony structures    No discrete marrow lesion      CERVICAL AND VISUALIZED THORACIC CORD:  Normal signal within the visualized cord      PREVERTEBRAL AND PARASPINAL SOFT TISSUES:  Normal      VISUALIZED POSTERIOR FOSSA:  The visualized posterior fossa demonstrates no abnormal signal      CERVICAL DISC SPACES:     C2-C3:  Unremarkable     C3-C4:  Unremarkable     C4-C5:  Unremarkable     C5-C6:  There is disc bulge and superimposed right greater than left bilateral foraminal disc protrusion with mild uncovertebral spurring  There is right greater than left lateral recesses narrowing and mild canal stenosis  There is mild to moderate   bilateral foraminal narrowing      C6-C7:  There is mild disc bulge and uncovertebral spurring resulting in mild canal and mild bilateral foraminal narrowing      C7-T1:  Unremarkable     UPPER THORACIC DISC SPACES:  Normal      IMPRESSION:     1  Disc bulge and superimposed right greater than left bilateral foraminal disc protrusion at C5-6 resulting in right greater than left lateral recesses narrowing without significant canal stenosis  Mild to moderate bilateral foraminal narrowing  Correlate for C6 radiculopathy      2   Mild canal and mild bilateral foraminal narrowing at C6-7  No orders of the defined types were placed in this encounter

## 2021-02-24 ENCOUNTER — APPOINTMENT (OUTPATIENT)
Dept: RADIOLOGY | Facility: MEDICAL CENTER | Age: 46
End: 2021-02-24
Payer: COMMERCIAL

## 2021-02-24 DIAGNOSIS — M62.838 MUSCLE SPASMS OF NECK: ICD-10-CM

## 2021-02-24 DIAGNOSIS — M54.2 NECK PAIN, ACUTE: ICD-10-CM

## 2021-02-24 DIAGNOSIS — M54.12 CERVICAL RADICULOPATHY: ICD-10-CM

## 2021-02-24 DIAGNOSIS — M54.16 LUMBAR RADICULOPATHY: ICD-10-CM

## 2021-02-24 PROCEDURE — 72110 X-RAY EXAM L-2 SPINE 4/>VWS: CPT

## 2021-02-24 PROCEDURE — 72050 X-RAY EXAM NECK SPINE 4/5VWS: CPT

## 2021-02-25 ENCOUNTER — APPOINTMENT (OUTPATIENT)
Dept: PHYSICAL THERAPY | Facility: CLINIC | Age: 46
End: 2021-02-25
Payer: COMMERCIAL

## 2021-02-26 ENCOUNTER — TELEPHONE (OUTPATIENT)
Dept: PAIN MEDICINE | Facility: CLINIC | Age: 46
End: 2021-02-26

## 2021-02-26 NOTE — TELEPHONE ENCOUNTER
S/w pt to see if she'd like her appt moved to 3/2 instead of 3/3 because of a cancellation  She said that that day actually worked a lot better for her, so she is r/s to 3/2 for her procedure with Dr Jose Zavala

## 2021-03-01 ENCOUNTER — TELEPHONE (OUTPATIENT)
Dept: PAIN MEDICINE | Facility: CLINIC | Age: 46
End: 2021-03-01

## 2021-03-02 ENCOUNTER — APPOINTMENT (OUTPATIENT)
Dept: RADIOLOGY | Facility: HOSPITAL | Age: 46
End: 2021-03-02
Payer: COMMERCIAL

## 2021-03-02 ENCOUNTER — HOSPITAL ENCOUNTER (OUTPATIENT)
Facility: HOSPITAL | Age: 46
Setting detail: OUTPATIENT SURGERY
Discharge: HOME | End: 2021-03-02
Attending: ANESTHESIOLOGY | Admitting: ANESTHESIOLOGY
Payer: COMMERCIAL

## 2021-03-02 VITALS
HEART RATE: 82 BPM | WEIGHT: 146 LBS | OXYGEN SATURATION: 99 % | TEMPERATURE: 97.1 F | SYSTOLIC BLOOD PRESSURE: 119 MMHG | BODY MASS INDEX: 26.87 KG/M2 | RESPIRATION RATE: 20 BRPM | DIASTOLIC BLOOD PRESSURE: 75 MMHG | HEIGHT: 62 IN

## 2021-03-02 DIAGNOSIS — M54.12 CERVICAL RADICULOPATHY: ICD-10-CM

## 2021-03-02 PROCEDURE — 62321 NJX INTERLAMINAR CRV/THRC: CPT | Performed by: ANESTHESIOLOGY

## 2021-03-02 RX ORDER — LIDOCAINE HYDROCHLORIDE 10 MG/ML
INJECTION, SOLUTION EPIDURAL; INFILTRATION; INTRACAUDAL; PERINEURAL AS NEEDED
Status: DISCONTINUED | OUTPATIENT
Start: 2021-03-02 | End: 2021-03-02 | Stop reason: HOSPADM

## 2021-03-02 RX ORDER — DEXAMETHASONE SODIUM PHOSPHATE 10 MG/ML
INJECTION, SOLUTION INTRAMUSCULAR; INTRAVENOUS AS NEEDED
Status: DISCONTINUED | OUTPATIENT
Start: 2021-03-02 | End: 2021-03-02 | Stop reason: HOSPADM

## 2021-03-02 NOTE — INTERVAL H&P NOTE
H&P reviewed  After examining the patient I find no changes in the patients condition since the H&P had been written      Vitals:    03/02/21 0956   BP: 128/63   Pulse: 81   Resp: 20   Temp: (!) 97 4 °F (36 3 °C)   SpO2: 99%

## 2021-03-02 NOTE — DISCHARGE INSTRUCTIONS
Epidural Steroid Injection   WHAT YOU NEED TO KNOW:   An epidural steroid injection (MARIKA) is a procedure to inject steroid medicine into the epidural space  The epidural space is between your spinal cord and vertebrae  Steroids reduce inflammation and fluid buildup in your spine that may be causing pain  You may be given pain medicine along with the steroids  ACTIVITY  · Do not drive or operate machinery today  · No strenuous activity today - bending, lifting, etc   · You may resume normal activites starting tomorrow - start slowly and as tolerated  · You may shower today, but no tub baths or hot tubs  · You may have numbness for several hours from the local anesthetic  Please use caution and common sense, especially with weight-bearing activities  CARE OF THE INJECTION SITE  · If you have soreness or pain, apply ice to the area today (20 minutes on/20 minutes off)  · Starting tomorrow, you may use warm, moist heat or ice if needed  · You may have an increase or change in your discomfort for 36-48 hours after your treatment  · Apply ice and continue with any pain medication you have been prescribed  · Notify the [facility] if you have any of the following: redness, drainage, swelling, headache, stiff neck or fever above 100°F     SPECIAL INSTRUCTIONS  · Our office will contact you in approximately 7 days for a progress report  MEDICATIONS  · Continue to take all routine medications  · Our office may have instructed you to hold some medications  If you have a problem specifically related to your procedure, please call our office at [phone]  Problems not related to your procedure should be directed to your primary care physician

## 2021-03-02 NOTE — OP NOTE
OPERATIVE REPORT  PATIENT NAME: Annemarie Arango    :  1975  MRN: [id number]  Pt Location: [facility] OR ROOM 01    SURGERY DATE: 3/2/2021    Surgeon(s) and Role:     * Stephane Orr MD - Primary    Preop Diagnosis:  Cervical radiculopathy [M54 12]    Post-Op Diagnosis Codes:     * Cervical radiculopathy [M54 12]    Procedure(s) (LRB):  C7-T1 interlaminar epidural steroid injection (Right)    Specimen(s):  * No specimens in log *    Estimated Blood Loss:   Minimal    Drains:  * No LDAs found *    Anesthesia Type:   Local    Operative Indications:  Cervical radiculopathy [M54 12]      Operative Findings:  same    Complications:   None    Procedure and Technique:  Fluoroscopically-guided  C7-T1 interlaminar epidural steroid injection under fluoroscopy      After discussing the risks, benefits, and alternatives to the procedure, the patient expressed understanding and wished to proceed  The patient was brought to the fluoroscopy suite and placed in the prone position  A procedural pause was conducted to verify:  correct patient identity, procedure to be performed and as applicable, correct side and site, correct patient position, and availability of implants, special equipment and special requirements  After identifying the C7-T1 space fluoroscopically, the skin was sterilely prepped and draped in the usual fashion using Chloraprep skin prep  The skin and subcutaneous tissue were anesthetized with 0 5 % lidocaine  Utilizing a loss of resistance technique and intermittent fluoroscopic guidance, a 3 5 20 gauge Tuohy needle was advanced into the epidural space  Proper needle positioning was confirmed using multiple fluoroscopic views  After negative aspiration, Omnipaque 300 contrast was injected confirming epidural spread without evidence of intravascular or intrathecal spread    A 4 ml solution consisting of 10 mg of dexamethasone in sterile saline was injected slowly and incrementally into the epidural space   Following the injection the needle was withdrawn slightly and flushed with 0 5 % lidocaine as it was fully extracted  The patient tolerated the procedure well and there were no apparent complications  After appropriate observation, the patient was dismissed from the clinic in good condition under their own power       I was present for the entire procedure    Patient Disposition:  hemodynamically stable    SIGNATURE: Lanette Prince MD  DATE: March 2, 2021  TIME: 10:09 AM

## 2021-03-09 ENCOUNTER — OFFICE VISIT (OUTPATIENT)
Dept: PAIN MEDICINE | Facility: CLINIC | Age: 46
End: 2021-03-09
Payer: COMMERCIAL

## 2021-03-09 VITALS
SYSTOLIC BLOOD PRESSURE: 102 MMHG | HEIGHT: 62 IN | DIASTOLIC BLOOD PRESSURE: 68 MMHG | BODY MASS INDEX: 26.24 KG/M2 | WEIGHT: 142.6 LBS

## 2021-03-09 DIAGNOSIS — M51.36 LUMBAR DEGENERATIVE DISC DISEASE: ICD-10-CM

## 2021-03-09 DIAGNOSIS — M54.12 CERVICAL RADICULOPATHY: Primary | ICD-10-CM

## 2021-03-09 DIAGNOSIS — M47.816 LUMBAR SPONDYLOSIS: ICD-10-CM

## 2021-03-09 PROCEDURE — 99214 OFFICE O/P EST MOD 30 MIN: CPT | Performed by: ANESTHESIOLOGY

## 2021-03-09 NOTE — PROGRESS NOTES
Assessment:  No diagnosis found  Plan:    Patient is a 80-year-old female complaints of neck pain, right upper extremity pain, chronic pain syndrome secondary to cervical radiculopathy presents to office for follow-up visit  Patient is status post C7-T1 interlaminar epidural steroid injection performed 2022 she reports 25% alleviation of symptoms  X-ray lumbar spine was reviewed with patient which shows tiny osteophytes at L4-5 and L5-S1   1  We will schedule patient for a 2nd right C7-T1 interlaminar epidural steroid injection    Complete risks and benefits including bleeding, infection, tissue reaction, nerve injury and allergic reaction were discussed  The approach was demonstrated using models and literature was provided  Verbal and written consent was obtained  History of Present Illness: The patient is a 41 y o  female who presents for a follow up office visit in regards to Shoulder Pain, Arm Pain, and Hand Pain  I have personally reviewed and/or updated the patient's past medical history, past surgical history, family history, social history, current medications, allergies, and vital signs today  Review of Systems  Review of Systems   All other systems reviewed and are negative  No past medical history on file      Past Surgical History:   Procedure Laterality Date     SECTION      CHOLECYSTECTOMY      EPIDURAL BLOCK INJECTION Right 3/2/2021    Procedure: C7-T1 interlaminar epidural steroid injection;  Surgeon: Jabsir Jeffries MD;  Location: [facility];  Service: Pain Management     FL GUIDED NEEDLE PLAC BX/ASP/INJ  3/2/2021    SINUS SURGERY         Family History   Problem Relation Age of Onset    No Known Problems Mother     No Known Problems Father        Social History     Occupational History    Not on file   Tobacco Use    Smoking status: Never Smoker    Smokeless tobacco: Never Used   Substance and Sexual Activity    Alcohol use: Not Currently    Drug use: Not Currently    Sexual activity: Not on file         Current Outpatient Medications:     acetaminophen (TYLENOL) 325 mg tablet, Take 650 mg by mouth every 6 (six) hours as needed for mild pain, Disp: , Rfl:     acyclovir (ZOVIRAX) 800 mg tablet, Take 1 tablet (800 mg total) by mouth every 4 (four) hours while awake for 10 days (Patient not taking: Reported on 1/5/2021), Disp: 50 tablet, Rfl: 0    cyclobenzaprine (FLEXERIL) 5 mg tablet, Take 1 tablet (5 mg total) by mouth daily at bedtime as needed for muscle spasms, Disp: 14 tablet, Rfl: 0    gabapentin (NEURONTIN) 300 mg capsule, Take 1 capsule (300 mg total) by mouth 3 (three) times a day, Disp: 90 capsule, Rfl: 1    methylPREDNISolone 4 MG tablet therapy pack, Use as directed on package, Disp: 21 tablet, Rfl: 0    rizatriptan (MAXALT-MLT) 5 MG disintegrating tablet, Take 1 tablet (5 mg total) by mouth once as needed for migraine for up to 1 dose May repeat in 2 hours if needed, Disp: 20 tablet, Rfl: 0    sertraline (ZOLOFT) 25 mg tablet, Take 1 tablet (25 mg total) by mouth daily, Disp: 30 tablet, Rfl: 0    No Known Allergies    Physical Exam:    /68 (BP Location: Left arm, Patient Position: Sitting, Cuff Size: Standard)   Ht 5' 2" (1 575 m)   Wt 64 7 kg (142 lb 9 6 oz)   BMI 26 08 kg/m²     Constitutional:normal, well developed, well nourished, alert, in no distress and non-toxic and no overt pain behavior  Eyes:anicteric  HEENT:grossly intact  Neck:supple, symmetric, trachea midline and no masses   Pulmonary:even and unlabored  Cardiovascular:No edema or pitting edema present  Skin:Normal without rashes or lesions and well hydrated  Psychiatric:Mood and affect appropriate  Neurologic:Cranial Nerves II-XII grossly intact  Musculoskeletal:normal     Cervical Spine examination demonstrates  Decreased ROM secondary to pain with lateral rotation to the left/right and bending to the left/right, in addition to neck flexion  4/5 Right upper extremity strength in all muscle groups  Negative Spurling's maneuver to the b/l Ue, sensitivity to light touch intact b/l Ue  Imaging  No orders to display       No orders of the defined types were placed in this encounter

## 2021-03-09 NOTE — H&P (VIEW-ONLY)
Assessment:  No diagnosis found  Plan:    Patient is a 56-year-old female complaints of neck pain, right upper extremity pain, chronic pain syndrome secondary to cervical radiculopathy presents to office for follow-up visit  Patient is status post C7-T1 interlaminar epidural steroid injection performed 2022 she reports 25% alleviation of symptoms  X-ray lumbar spine was reviewed with patient which shows tiny osteophytes at L4-5 and L5-S1   1  We will schedule patient for a 2nd right C7-T1 interlaminar epidural steroid injection    Complete risks and benefits including bleeding, infection, tissue reaction, nerve injury and allergic reaction were discussed  The approach was demonstrated using models and literature was provided  Verbal and written consent was obtained  History of Present Illness: The patient is a 41 y o  female who presents for a follow up office visit in regards to Shoulder Pain, Arm Pain, and Hand Pain  I have personally reviewed and/or updated the patient's past medical history, past surgical history, family history, social history, current medications, allergies, and vital signs today  Review of Systems  Review of Systems   All other systems reviewed and are negative  No past medical history on file      Past Surgical History:   Procedure Laterality Date     SECTION      CHOLECYSTECTOMY      EPIDURAL BLOCK INJECTION Right 3/2/2021    Procedure: C7-T1 interlaminar epidural steroid injection;  Surgeon: Carlos Alberto Trujillo MD;  Location: [facility];  Service: Pain Management     FL GUIDED NEEDLE PLAC BX/ASP/INJ  3/2/2021    SINUS SURGERY         Family History   Problem Relation Age of Onset    No Known Problems Mother     No Known Problems Father        Social History     Occupational History    Not on file   Tobacco Use    Smoking status: Never Smoker    Smokeless tobacco: Never Used   Substance and Sexual Activity    Alcohol use: Not Currently    Drug use: Not Currently    Sexual activity: Not on file         Current Outpatient Medications:     acetaminophen (TYLENOL) 325 mg tablet, Take 650 mg by mouth every 6 (six) hours as needed for mild pain, Disp: , Rfl:     acyclovir (ZOVIRAX) 800 mg tablet, Take 1 tablet (800 mg total) by mouth every 4 (four) hours while awake for 10 days (Patient not taking: Reported on 1/5/2021), Disp: 50 tablet, Rfl: 0    cyclobenzaprine (FLEXERIL) 5 mg tablet, Take 1 tablet (5 mg total) by mouth daily at bedtime as needed for muscle spasms, Disp: 14 tablet, Rfl: 0    gabapentin (NEURONTIN) 300 mg capsule, Take 1 capsule (300 mg total) by mouth 3 (three) times a day, Disp: 90 capsule, Rfl: 1    methylPREDNISolone 4 MG tablet therapy pack, Use as directed on package, Disp: 21 tablet, Rfl: 0    rizatriptan (MAXALT-MLT) 5 MG disintegrating tablet, Take 1 tablet (5 mg total) by mouth once as needed for migraine for up to 1 dose May repeat in 2 hours if needed, Disp: 20 tablet, Rfl: 0    sertraline (ZOLOFT) 25 mg tablet, Take 1 tablet (25 mg total) by mouth daily, Disp: 30 tablet, Rfl: 0    No Known Allergies    Physical Exam:    /68 (BP Location: Left arm, Patient Position: Sitting, Cuff Size: Standard)   Ht 5' 2" (1 575 m)   Wt 64 7 kg (142 lb 9 6 oz)   BMI 26 08 kg/m²     Constitutional:normal, well developed, well nourished, alert, in no distress and non-toxic and no overt pain behavior  Eyes:anicteric  HEENT:grossly intact  Neck:supple, symmetric, trachea midline and no masses   Pulmonary:even and unlabored  Cardiovascular:No edema or pitting edema present  Skin:Normal without rashes or lesions and well hydrated  Psychiatric:Mood and affect appropriate  Neurologic:Cranial Nerves II-XII grossly intact  Musculoskeletal:normal     Cervical Spine examination demonstrates  Decreased ROM secondary to pain with lateral rotation to the left/right and bending to the left/right, in addition to neck flexion  4/5 Right upper extremity strength in all muscle groups  Negative Spurling's maneuver to the b/l Ue, sensitivity to light touch intact b/l Ue  Imaging  No orders to display       No orders of the defined types were placed in this encounter

## 2021-03-15 NOTE — TELEPHONE ENCOUNTER
Pt called in to cancel her procedure  Please be advise thank you        Please call patient back @ [phone]

## 2021-03-22 NOTE — PROGRESS NOTES
Patient seen for 4 total visits of PT  Last visit 2/11/21  Patient continued with pain and was to call her pain management doctor  Patient to be d/c at this time

## 2021-03-26 ENCOUNTER — APPOINTMENT (OUTPATIENT)
Dept: RADIOLOGY | Facility: HOSPITAL | Age: 46
End: 2021-03-26
Payer: COMMERCIAL

## 2021-03-26 ENCOUNTER — HOSPITAL ENCOUNTER (OUTPATIENT)
Facility: HOSPITAL | Age: 46
Setting detail: OUTPATIENT SURGERY
Discharge: HOME | End: 2021-03-26
Attending: ANESTHESIOLOGY | Admitting: ANESTHESIOLOGY
Payer: COMMERCIAL

## 2021-03-26 VITALS
RESPIRATION RATE: 20 BRPM | DIASTOLIC BLOOD PRESSURE: 58 MMHG | SYSTOLIC BLOOD PRESSURE: 123 MMHG | TEMPERATURE: 97.5 F | HEART RATE: 72 BPM | OXYGEN SATURATION: 99 %

## 2021-03-26 DIAGNOSIS — M54.12 CERVICAL RADICULOPATHY: ICD-10-CM

## 2021-03-26 PROCEDURE — 62321 NJX INTERLAMINAR CRV/THRC: CPT | Performed by: ANESTHESIOLOGY

## 2021-03-26 RX ORDER — DEXAMETHASONE SODIUM PHOSPHATE 10 MG/ML
INJECTION, SOLUTION INTRAMUSCULAR; INTRAVENOUS AS NEEDED
Status: DISCONTINUED | OUTPATIENT
Start: 2021-03-26 | End: 2021-03-26 | Stop reason: HOSPADM

## 2021-03-26 RX ORDER — LIDOCAINE HYDROCHLORIDE 10 MG/ML
INJECTION, SOLUTION EPIDURAL; INFILTRATION; INTRACAUDAL; PERINEURAL AS NEEDED
Status: DISCONTINUED | OUTPATIENT
Start: 2021-03-26 | End: 2021-03-26 | Stop reason: HOSPADM

## 2021-03-26 NOTE — DISCHARGE INSTRUCTIONS
Epidural Steroid Injection   WHAT YOU NEED TO KNOW:   An epidural steroid injection (MARIKA) is a procedure to inject steroid medicine into the epidural space  The epidural space is between your spinal cord and vertebrae  Steroids reduce inflammation and fluid buildup in your spine that may be causing pain  You may be given pain medicine along with the steroids  ACTIVITY  · Do not drive or operate machinery today  · No strenuous activity today - bending, lifting, etc   · You may resume normal activites starting tomorrow - start slowly and as tolerated  · You may shower today, but no tub baths or hot tubs  · You may have numbness for several hours from the local anesthetic  Please use caution and common sense, especially with weight-bearing activities  CARE OF THE INJECTION SITE  · If you have soreness or pain, apply ice to the area today (20 minutes on/20 minutes off)  · Starting tomorrow, you may use warm, moist heat or ice if needed  · You may have an increase or change in your discomfort for 36-48 hours after your treatment  · Apply ice and continue with any pain medication you have been prescribed  · Notify [facility] if you have any of the following: redness, drainage, swelling, headache, stiff neck or fever above 100°F     SPECIAL INSTRUCTIONS  · Our office will contact you in approximately 7 days for a progress report  MEDICATIONS  · Continue to take all routine medications  · Our office may have instructed you to hold some medications  As no general anesthesia was used in today's procedure, you should not experience any side effects related to anesthesia  If you have a problem specifically related to your procedure, please call our office at [phone]  Problems not related to your procedure should be directed to your primary care physician

## 2021-03-26 NOTE — INTERVAL H&P NOTE
H&P reviewed  After examining the patient I find no changes in the patients condition since the H&P had been written      Vitals:    03/26/21 1033   BP: 98/71   Pulse: 80   Resp: 16   Temp: 98 4 °F (36 9 °C)   SpO2: 99%

## 2021-03-30 ENCOUNTER — APPOINTMENT (OUTPATIENT)
Dept: LAB | Facility: MEDICAL CENTER | Age: 46
End: 2021-03-30
Payer: COMMERCIAL

## 2021-03-30 ENCOUNTER — OFFICE VISIT (OUTPATIENT)
Dept: FAMILY MEDICINE CLINIC | Facility: CLINIC | Age: 46
End: 2021-03-30
Payer: COMMERCIAL

## 2021-03-30 VITALS
HEIGHT: 62 IN | DIASTOLIC BLOOD PRESSURE: 74 MMHG | TEMPERATURE: 97.8 F | SYSTOLIC BLOOD PRESSURE: 118 MMHG | OXYGEN SATURATION: 98 % | WEIGHT: 143.6 LBS | HEART RATE: 75 BPM | BODY MASS INDEX: 26.43 KG/M2

## 2021-03-30 DIAGNOSIS — R53.83 FATIGUE, UNSPECIFIED TYPE: Primary | ICD-10-CM

## 2021-03-30 DIAGNOSIS — R53.83 FATIGUE, UNSPECIFIED TYPE: ICD-10-CM

## 2021-03-30 LAB
25(OH)D3 SERPL-MCNC: 17.2 NG/ML (ref 30–100)
ALBUMIN SERPL BCP-MCNC: 3.9 G/DL (ref 3.5–5)
ALP SERPL-CCNC: 47 U/L (ref 46–116)
ALT SERPL W P-5'-P-CCNC: 18 U/L (ref 12–78)
ANION GAP SERPL CALCULATED.3IONS-SCNC: 3 MMOL/L (ref 4–13)
AST SERPL W P-5'-P-CCNC: 7 U/L (ref 5–45)
BASOPHILS # BLD AUTO: 0.06 THOUSANDS/ΜL (ref 0–0.1)
BASOPHILS NFR BLD AUTO: 1 % (ref 0–1)
BILIRUB SERPL-MCNC: 0.95 MG/DL (ref 0.2–1)
BUN SERPL-MCNC: 12 MG/DL (ref 5–25)
CALCIUM SERPL-MCNC: 9 MG/DL (ref 8.3–10.1)
CHLORIDE SERPL-SCNC: 110 MMOL/L (ref 100–108)
CO2 SERPL-SCNC: 28 MMOL/L (ref 21–32)
CREAT SERPL-MCNC: 0.82 MG/DL (ref 0.6–1.3)
EOSINOPHIL # BLD AUTO: 0.12 THOUSAND/ΜL (ref 0–0.61)
EOSINOPHIL NFR BLD AUTO: 2 % (ref 0–6)
ERYTHROCYTE [DISTWIDTH] IN BLOOD BY AUTOMATED COUNT: 12.5 % (ref 11.6–15.1)
GFR SERPL CREATININE-BSD FRML MDRD: 87 ML/MIN/1.73SQ M
GLUCOSE P FAST SERPL-MCNC: 74 MG/DL (ref 65–99)
HCT VFR BLD AUTO: 39.7 % (ref 34.8–46.1)
HGB BLD-MCNC: 13.2 G/DL (ref 11.5–15.4)
IMM GRANULOCYTES # BLD AUTO: 0.02 THOUSAND/UL (ref 0–0.2)
IMM GRANULOCYTES NFR BLD AUTO: 0 % (ref 0–2)
LYMPHOCYTES # BLD AUTO: 3.04 THOUSANDS/ΜL (ref 0.6–4.47)
LYMPHOCYTES NFR BLD AUTO: 46 % (ref 14–44)
MCH RBC QN AUTO: 32.3 PG (ref 26.8–34.3)
MCHC RBC AUTO-ENTMCNC: 33.2 G/DL (ref 31.4–37.4)
MCV RBC AUTO: 97 FL (ref 82–98)
MONOCYTES # BLD AUTO: 0.59 THOUSAND/ΜL (ref 0.17–1.22)
MONOCYTES NFR BLD AUTO: 9 % (ref 4–12)
NEUTROPHILS # BLD AUTO: 2.81 THOUSANDS/ΜL (ref 1.85–7.62)
NEUTS SEG NFR BLD AUTO: 42 % (ref 43–75)
NRBC BLD AUTO-RTO: 0 /100 WBCS
PLATELET # BLD AUTO: 358 THOUSANDS/UL (ref 149–390)
PMV BLD AUTO: 9.7 FL (ref 8.9–12.7)
POTASSIUM SERPL-SCNC: 3.9 MMOL/L (ref 3.5–5.3)
PROT SERPL-MCNC: 7.6 G/DL (ref 6.4–8.2)
RBC # BLD AUTO: 4.09 MILLION/UL (ref 3.81–5.12)
SODIUM SERPL-SCNC: 141 MMOL/L (ref 136–145)
TSH SERPL DL<=0.05 MIU/L-ACNC: 1.53 UIU/ML (ref 0.36–3.74)
WBC # BLD AUTO: 6.64 THOUSAND/UL (ref 4.31–10.16)

## 2021-03-30 PROCEDURE — 1036F TOBACCO NON-USER: CPT | Performed by: PHYSICIAN ASSISTANT

## 2021-03-30 PROCEDURE — 99214 OFFICE O/P EST MOD 30 MIN: CPT | Performed by: PHYSICIAN ASSISTANT

## 2021-03-30 PROCEDURE — 84443 ASSAY THYROID STIM HORMONE: CPT

## 2021-03-30 PROCEDURE — 36415 COLL VENOUS BLD VENIPUNCTURE: CPT

## 2021-03-30 PROCEDURE — 3008F BODY MASS INDEX DOCD: CPT | Performed by: PHYSICIAN ASSISTANT

## 2021-03-30 PROCEDURE — 80053 COMPREHEN METABOLIC PANEL: CPT

## 2021-03-30 PROCEDURE — 82306 VITAMIN D 25 HYDROXY: CPT

## 2021-03-30 PROCEDURE — 85025 COMPLETE CBC W/AUTO DIFF WBC: CPT

## 2021-03-30 NOTE — PROGRESS NOTES
Assessment/Plan:    Problem List Items Addressed This Visit     None      Visit Diagnoses     Fatigue, unspecified type    -  Primary    Relevant Orders    TSH, 3rd generation with Free T4 reflex    CBC and differential    Comprehensive metabolic panel    Vitamin D 25 hydroxy         Diagnoses and all orders for this visit:    Fatigue, unspecified type  -     TSH, 3rd generation with Free T4 reflex; Future  -     CBC and differential; Future  -     Comprehensive metabolic panel; Future  -     Vitamin D 25 hydroxy; Future        Will get labs to evaluate fatigue  Anxiety may be attributing to fatigue  We will wait for labs to return before discussing other anxiety medications  Subjective:      Patient ID: Ayaz Smith is a 41 y o  female  Marcelino Jerald is a 41year old female who is here today because she has felt "off" for the past few weeks  She feels she has no energy  She has noticed over the past few months she gained about 15 pounds  She has not changed her routine  She does suffer from anxiety  She was on zoloft, but stopped it because she felt this was attributing to her weight gain  She feels her anxiety is about the same  She feels tired all of the time and has no motivation to do things around her house  By noon she finds herself taking naps on the couch  She sleeps well  She does not snore  She has not been told that she gasps in her sleep or wakes up in the middle of the night  She is not sure if thyroid disease runs in her family  She is not currently taking any medications  She also feels at times that her calves feel like they are cramping  She denies any swelling, redness, or trauma  The following portions of the patient's history were reviewed and updated as appropriate:   She has no past medical history on file  ,  does not have any pertinent problems on file  ,   has a past surgical history that includes Cholecystectomy;  section; Sinus surgery;  Epidural block injection (Right, 3/2/2021); FL guided needle plac bx/asp/inj (3/2/2021); and Epidural block injection (Right, 3/26/2021)  ,  family history includes No Known Problems in her father and mother  ,   reports that she has never smoked  She has never used smokeless tobacco  She reports previous alcohol use  No history on file for drug ,  has No Known Allergies     Current Outpatient Medications   Medication Sig Dispense Refill    acetaminophen (TYLENOL) 325 mg tablet Take 650 mg by mouth every 6 (six) hours as needed for mild pain      acyclovir (ZOVIRAX) 800 mg tablet Take 1 tablet (800 mg total) by mouth every 4 (four) hours while awake for 10 days (Patient not taking: Reported on 1/5/2021) 50 tablet 0    cyclobenzaprine (FLEXERIL) 5 mg tablet Take 1 tablet (5 mg total) by mouth daily at bedtime as needed for muscle spasms (Patient not taking: Reported on 3/9/2021) 14 tablet 0    gabapentin (NEURONTIN) 300 mg capsule Take 1 capsule (300 mg total) by mouth 3 (three) times a day (Patient not taking: Reported on 3/9/2021) 90 capsule 1    rizatriptan (MAXALT-MLT) 5 MG disintegrating tablet Take 1 tablet (5 mg total) by mouth once as needed for migraine for up to 1 dose May repeat in 2 hours if needed (Patient not taking: Reported on 3/30/2021) 20 tablet 0    sertraline (ZOLOFT) 25 mg tablet Take 1 tablet (25 mg total) by mouth daily (Patient not taking: Reported on 3/9/2021) 30 tablet 0     No current facility-administered medications for this visit  Review of Systems   Constitutional: Positive for fatigue  Negative for chills, diaphoresis and fever  HENT: Negative for congestion, ear pain, postnasal drip, rhinorrhea, sneezing, sore throat and trouble swallowing  Eyes: Negative for pain and visual disturbance  Respiratory: Negative for apnea, cough, shortness of breath and wheezing  Cardiovascular: Negative for chest pain and palpitations     Gastrointestinal: Negative for abdominal pain, constipation, diarrhea, nausea and vomiting  Genitourinary: Negative for dysuria and hematuria  Musculoskeletal: Negative for arthralgias, gait problem and myalgias  Neurological: Negative for dizziness, syncope, weakness, light-headedness, numbness and headaches  Psychiatric/Behavioral: Negative for suicidal ideas  The patient is nervous/anxious  Objective:  Vitals:    03/30/21 0927   BP: 118/74   Pulse: 75   Temp: 97 8 °F (36 6 °C)   SpO2: 98%   Weight: 65 1 kg (143 lb 9 6 oz)   Height: 5' 2" (1 575 m)     Body mass index is 26 26 kg/m²  Physical Exam  Vitals signs and nursing note reviewed  Constitutional:       Appearance: She is well-developed  HENT:      Head: Normocephalic and atraumatic  Right Ear: Tympanic membrane, ear canal and external ear normal       Left Ear: Tympanic membrane, ear canal and external ear normal       Nose: Nose normal       Mouth/Throat:      Pharynx: No oropharyngeal exudate or posterior oropharyngeal erythema  Eyes:      Extraocular Movements: Extraocular movements intact  Neck:      Musculoskeletal: Normal range of motion and neck supple  Cardiovascular:      Rate and Rhythm: Normal rate and regular rhythm  Heart sounds: Normal heart sounds  No murmur  No friction rub  No gallop  Pulmonary:      Effort: Pulmonary effort is normal  No respiratory distress  Breath sounds: Normal breath sounds  No wheezing or rales  Musculoskeletal: Normal range of motion  Lymphadenopathy:      Cervical: No cervical adenopathy  Skin:     General: Skin is warm and dry  Neurological:      Mental Status: She is alert and oriented to person, place, and time  Psychiatric:         Behavior: Behavior normal          Thought Content: Thought content normal          Judgment: Judgment normal          BMI Counseling: Body mass index is 26 26 kg/m²  The BMI is above normal  Nutrition recommendations include decreasing overall calorie intake and 3-5 servings of fruits/vegetables daily  Exercise recommendations include exercising 3-5 times per week

## 2021-03-31 ENCOUNTER — TELEPHONE (OUTPATIENT)
Dept: FAMILY MEDICINE CLINIC | Facility: CLINIC | Age: 46
End: 2021-03-31

## 2021-03-31 NOTE — TELEPHONE ENCOUNTER
Patient called to see if Potassium was low, it was normal, she wanted to know if there was anything you could give her for the tightness in her calfs

## 2021-06-07 ENCOUNTER — DOCTOR'S OFFICE (OUTPATIENT)
Dept: URBAN - NONMETROPOLITAN AREA CLINIC 1 | Facility: CLINIC | Age: 46
Setting detail: OPHTHALMOLOGY
End: 2021-06-07
Payer: COMMERCIAL

## 2021-06-07 DIAGNOSIS — H52.223: ICD-10-CM

## 2021-06-07 PROBLEM — H10.31 CONJUNCTIVITS ACUTE UNSPECIFIED; RIGHT EYE: Status: RESOLVED | Noted: 2020-09-16 | Resolved: 2021-06-07

## 2021-06-07 PROCEDURE — 92014 COMPRE OPH EXAM EST PT 1/>: CPT | Performed by: OPTOMETRIST

## 2021-06-07 ASSESSMENT — KERATOMETRY
OD_AXISANGLE_DEGREES: 095
OS_AXISANGLE_DEGREES: 088
OD_K1POWER_DIOPTERS: 42.25
OS_K2POWER_DIOPTERS: 46.25
OS_K1POWER_DIOPTERS: 41.50
OD_K2POWER_DIOPTERS: 47.50

## 2021-06-07 ASSESSMENT — PACHYMETRY
OS_CT_UM: 587
OD_CT_CORRECTION: -3
OS_CT_CORRECTION: -3
OD_CT_UM: 580

## 2021-06-07 ASSESSMENT — REFRACTION_MANIFEST
OD_VA1: 20/25
OD_ADD: +2.00
OD_CYLINDER: -5.25
OS_AXIS: 175
OS_SPHERE: PLANO
OD_SPHERE: PLANO
OS_VA1: 20/25
OS_ADD: +2.00
OS_CYLINDER: -5.00
OD_AXIS: 005
OS_VA2: 20/25
OD_VA2: 20/25

## 2021-06-07 ASSESSMENT — REFRACTION_CURRENTRX
OD_CYLINDER: -4.50
OD_AXIS: 12
OD_ADD: +1.00
OS_ADD: +1.00
OS_SPHERE: PLANO
OS_OVR_VA: 20/
OD_SPHERE: PLANO
OS_VPRISM_DIRECTION: PROGS
OS_CYLINDER: -4.50
OS_AXIS: 165
OD_VPRISM_DIRECTION: PROGS
OD_OVR_VA: 20/

## 2021-06-07 ASSESSMENT — AXIALLENGTH_DERIVED
OS_AL: 24.3547
OD_AL: 23.9192

## 2021-06-07 ASSESSMENT — TONOMETRY
OD_IOP_MMHG: 17
OS_IOP_MMHG: 17

## 2021-06-07 ASSESSMENT — CONFRONTATIONAL VISUAL FIELD TEST (CVF)
OS_FINDINGS: FULL
OD_FINDINGS: FULL

## 2021-06-07 ASSESSMENT — VISUAL ACUITY
OS_BCVA: 20/20
OD_BCVA: 20/20

## 2021-06-07 ASSESSMENT — REFRACTION_AUTOREFRACTION
OS_SPHERE: -0.25
OD_AXIS: 003
OD_CYLINDER: -4.25
OS_CYLINDER: -4.00
OS_AXIS: 1720
OD_SPHERE: 0.00

## 2021-06-07 ASSESSMENT — SPHEQUIV_DERIVED
OD_SPHEQUIV: -2.125
OS_SPHEQUIV: -2.25

## 2021-06-21 ENCOUNTER — DOCTOR'S OFFICE (OUTPATIENT)
Dept: URBAN - NONMETROPOLITAN AREA CLINIC 1 | Facility: CLINIC | Age: 46
Setting detail: OPHTHALMOLOGY
End: 2021-06-21
Payer: COMMERCIAL

## 2021-06-21 DIAGNOSIS — H40.2234: ICD-10-CM

## 2021-06-21 PROCEDURE — 92014 COMPRE OPH EXAM EST PT 1/>: CPT | Performed by: OPHTHALMOLOGY

## 2021-06-21 PROCEDURE — 92020 GONIOSCOPY: CPT | Performed by: OPHTHALMOLOGY

## 2021-06-21 ASSESSMENT — REFRACTION_MANIFEST
OD_AXIS: 005
OD_CYLINDER: -5.25
OS_SPHERE: PLANO
OD_VA1: 20/25
OS_AXIS: 175
OS_ADD: +2.00
OD_VA2: 20/25
OD_ADD: +2.00
OD_SPHERE: PLANO
OS_VA2: 20/25
OS_VA1: 20/25
OS_CYLINDER: -5.00

## 2021-06-21 ASSESSMENT — SPHEQUIV_DERIVED
OD_SPHEQUIV: -2.25
OS_SPHEQUIV: -2.375

## 2021-06-21 ASSESSMENT — REFRACTION_AUTOREFRACTION
OD_CYLINDER: -5.00
OD_SPHERE: +0.25
OD_AXIS: 003
OS_SPHERE: +0.25
OS_AXIS: 172
OS_CYLINDER: -5.25

## 2021-06-21 ASSESSMENT — KERATOMETRY
OS_K2POWER_DIOPTERS: 45.75
OS_AXISANGLE_DEGREES: 085
OD_AXISANGLE_DEGREES: 090
OD_K1POWER_DIOPTERS: 41.00
OS_K1POWER_DIOPTERS: 41.50
OD_K2POWER_DIOPTERS: 45.75

## 2021-06-21 ASSESSMENT — REFRACTION_CURRENTRX
OS_AXIS: 165
OD_SPHERE: PLANO
OD_VPRISM_DIRECTION: PROGS
OD_AXIS: 12
OS_SPHERE: PLANO
OD_CYLINDER: -4.50
OS_CYLINDER: -4.50
OS_ADD: +1.00
OD_OVR_VA: 20/
OD_ADD: +1.00
OS_OVR_VA: 20/
OS_VPRISM_DIRECTION: PROGS

## 2021-06-21 ASSESSMENT — PACHYMETRY
OS_CT_CORRECTION: -3
OD_CT_UM: 580
OD_CT_CORRECTION: -3
OS_CT_UM: 587

## 2021-06-21 ASSESSMENT — CONFRONTATIONAL VISUAL FIELD TEST (CVF)
OS_FINDINGS: FULL
OD_FINDINGS: FULL

## 2021-06-21 ASSESSMENT — AXIALLENGTH_DERIVED
OS_AL: 24.5054
OD_AL: 24.552

## 2021-06-21 ASSESSMENT — VISUAL ACUITY
OD_BCVA: 20/20
OS_BCVA: 20/25+2

## 2021-06-24 ENCOUNTER — TELEPHONE (OUTPATIENT)
Dept: PAIN MEDICINE | Facility: CLINIC | Age: 46
End: 2021-06-24

## 2021-06-24 NOTE — TELEPHONE ENCOUNTER
Patient has 3 cancellations and 1 no show    Please advise on scheduling       Thank you     [phone]

## 2021-09-08 ENCOUNTER — RX ONLY (RX ONLY)
Age: 46
End: 2021-09-08

## 2021-09-08 ENCOUNTER — DOCTOR'S OFFICE (OUTPATIENT)
Dept: URBAN - NONMETROPOLITAN AREA CLINIC 1 | Facility: CLINIC | Age: 46
Setting detail: OPHTHALMOLOGY
End: 2021-09-08
Payer: COMMERCIAL

## 2021-09-08 DIAGNOSIS — H40.2234: ICD-10-CM

## 2021-09-08 PROBLEM — H52.223 ASTIGMATISM, REGULAR; BOTH EYES: Status: ACTIVE | Noted: 2020-06-01

## 2021-09-08 PROCEDURE — 92083 EXTENDED VISUAL FIELD XM: CPT | Performed by: OPHTHALMOLOGY

## 2021-09-08 PROCEDURE — 76514 ECHO EXAM OF EYE THICKNESS: CPT | Performed by: OPHTHALMOLOGY

## 2021-09-08 PROCEDURE — 92012 INTRM OPH EXAM EST PATIENT: CPT | Performed by: OPHTHALMOLOGY

## 2021-09-08 PROCEDURE — 92133 CPTRZD OPH DX IMG PST SGM ON: CPT | Performed by: OPHTHALMOLOGY

## 2021-09-08 ASSESSMENT — REFRACTION_MANIFEST
OS_VA1: 20/25
OS_SPHERE: PLANO
OS_VA2: 20/25
OD_AXIS: 005
OS_ADD: +2.00
OD_SPHERE: PLANO
OS_AXIS: 175
OD_VA1: 20/25
OS_CYLINDER: -5.00
OD_VA2: 20/25
OD_ADD: +2.00
OD_CYLINDER: -5.25

## 2021-09-08 ASSESSMENT — TONOMETRY: OS_IOP_MMHG: 12

## 2021-09-08 ASSESSMENT — KERATOMETRY
OD_K1POWER_DIOPTERS: 43.25
OS_AXISANGLE_DEGREES: 177
OS_K2POWER_DIOPTERS: 45.50
OD_AXISANGLE_DEGREES: 002
OS_K1POWER_DIOPTERS: 41.50
OD_K2POWER_DIOPTERS: 45.75

## 2021-09-08 ASSESSMENT — REFRACTION_AUTOREFRACTION
OD_SPHERE: +0.50
OD_CYLINDER: -4.75
OS_CYLINDER: -5.25
OD_AXIS: 173
OS_AXIS: 176
OS_SPHERE: +0.25

## 2021-09-08 ASSESSMENT — SPHEQUIV_DERIVED
OD_SPHEQUIV: -1.875
OS_SPHEQUIV: -2.375

## 2021-09-08 ASSESSMENT — REFRACTION_CURRENTRX
OD_SPHERE: PLANO
OS_AXIS: 165
OD_VPRISM_DIRECTION: PROGS
OS_SPHERE: PLANO
OD_AXIS: 12
OS_CYLINDER: -4.50
OS_ADD: +1.00
OS_OVR_VA: 20/
OD_OVR_VA: 20/
OD_CYLINDER: -4.50
OS_VPRISM_DIRECTION: PROGS
OD_ADD: +1.00

## 2021-09-08 ASSESSMENT — PACHYMETRY
OD_CT_UM: 580
OS_CT_UM: 587
OS_CT_CORRECTION: -3
OD_CT_CORRECTION: -3

## 2021-09-08 ASSESSMENT — AXIALLENGTH_DERIVED
OD_AL: 23.9608
OS_AL: 24.555

## 2021-09-08 ASSESSMENT — VISUAL ACUITY
OD_BCVA: 20/20
OS_BCVA: 20/20

## 2021-09-21 ENCOUNTER — OFFICE VISIT (OUTPATIENT)
Dept: PAIN MEDICINE | Facility: CLINIC | Age: 46
End: 2021-09-21
Payer: COMMERCIAL

## 2021-09-21 VITALS
WEIGHT: 141 LBS | HEIGHT: 62 IN | HEART RATE: 96 BPM | SYSTOLIC BLOOD PRESSURE: 113 MMHG | BODY MASS INDEX: 25.95 KG/M2 | DIASTOLIC BLOOD PRESSURE: 77 MMHG

## 2021-09-21 DIAGNOSIS — M54.16 LUMBAR RADICULOPATHY: ICD-10-CM

## 2021-09-21 DIAGNOSIS — M54.12 CERVICAL RADICULOPATHY: ICD-10-CM

## 2021-09-21 DIAGNOSIS — M48.02 CERVICAL SPINAL STENOSIS: ICD-10-CM

## 2021-09-21 DIAGNOSIS — G89.29 CHRONIC MIDLINE LOW BACK PAIN WITHOUT SCIATICA: ICD-10-CM

## 2021-09-21 DIAGNOSIS — G89.4 CHRONIC PAIN SYNDROME: Primary | ICD-10-CM

## 2021-09-21 DIAGNOSIS — M54.50 CHRONIC MIDLINE LOW BACK PAIN WITHOUT SCIATICA: ICD-10-CM

## 2021-09-21 DIAGNOSIS — G56.03 CARPAL TUNNEL SYNDROME, BILATERAL: ICD-10-CM

## 2021-09-21 PROCEDURE — 99214 OFFICE O/P EST MOD 30 MIN: CPT | Performed by: NURSE PRACTITIONER

## 2021-09-21 NOTE — PROGRESS NOTES
Assessment:  1  Chronic pain syndrome    2  Chronic midline low back pain without sciatica    3  Lumbar radiculopathy    4  Cervical radiculopathy    5  Cervical spinal stenosis    6  Carpal tunnel syndrome, bilateral        Plan:   While the patient was in the office today, I did have a thorough conversation regarding their chronic pain syndrome, medication management, and treatment plan options  Patient being seen for follow-up visit  She was last seen here on March 26 21 at which time she underwent an interlaminar C7-T1 epidural steroid injection  She reports that her neck pain has greatly improved and the shooting pain down her arms has resolved  Her biggest complaint today is continued low back pain  She was previously given a prescription to start physical therapy which she never started  Today, I provided her with another prescription to start physical therapy for lumbar core strengthening / stretching  Also, she is complaining of increasing pain and numbness in her hands which she is afraid is related to carpal tunnel syndrome  Tinel's test and Phalen test were both positive bilaterally  I ordered an EMG of both upper extremities to rule out carpal tunnel syndrome  The patient will follow-up in 6 weeks for medication prescription refill and reevaluation  The patient was advised to contact the office should their symptoms worsen in the interim  The patient was agreeable and verbalized an understanding  History of Present Illness: The patient is a 40 y o  female who presents for a follow up office visit in regards to Back Pain and Hand Pain  The patients current symptoms include Complaints of low back pain  Complaints of pain and tingling in both hands  Current pain level is a 7/10  Quality of pain is described as dull, aching, throbbing, numb, pins and needles  Current pain medications includes:  None         I have personally reviewed and/or updated the patient's past medical history, past surgical history, family history, social history, current medications, allergies, and vital signs today  Review of Systems  Review of Systems   Musculoskeletal: Positive for back pain and gait problem  Decreased range of motion   Joint stiffness   Pain in extremity- lower back      All other systems reviewed and are negative  History reviewed  No pertinent past medical history      Past Surgical History:   Procedure Laterality Date     SECTION      CHOLECYSTECTOMY      EPIDURAL BLOCK INJECTION Right 3/2/2021    Procedure: C7-T1 interlaminar epidural steroid injection;  Surgeon: Jeffy Murguia MD;  Location: [facility];  Service: Pain Management     EPIDURAL BLOCK INJECTION Right 3/26/2021    Procedure: C7-T1 interlaminar epidural steroid injection;  Surgeon: Jeffy Murguia MD;  Location: [facility];  Service: Pain Management     FL GUIDED NEEDLE PLAC BX/ASP/INJ  3/2/2021    FL GUIDED NEEDLE PLAC BX/ASP/INJ  3/26/2021    SINUS SURGERY         Family History   Problem Relation Age of Onset    No Known Problems Mother     No Known Problems Father        Social History     Occupational History    Not on file   Tobacco Use    Smoking status: Never Smoker    Smokeless tobacco: Never Used   Vaping Use    Vaping Use: Never used   Substance and Sexual Activity    Alcohol use: Not Currently    Drug use: Not on file    Sexual activity: Not on file         Current Outpatient Medications:     acetaminophen (TYLENOL) 325 mg tablet, Take 650 mg by mouth every 6 (six) hours as needed for mild pain , Disp: , Rfl:     acyclovir (ZOVIRAX) 800 mg tablet, Take 1 tablet (800 mg total) by mouth every 4 (four) hours while awake for 10 days (Patient not taking: Reported on 2021), Disp: 50 tablet, Rfl: 0    cyclobenzaprine (FLEXERIL) 5 mg tablet, Take 1 tablet (5 mg total) by mouth daily at bedtime as needed for muscle spasms (Patient not taking: Reported on 3/9/2021), Disp: 14 tablet, Rfl: 0    gabapentin (NEURONTIN) 300 mg capsule, Take 1 capsule (300 mg total) by mouth 3 (three) times a day (Patient not taking: Reported on 3/9/2021), Disp: 90 capsule, Rfl: 1    rizatriptan (MAXALT-MLT) 5 MG disintegrating tablet, Take 1 tablet (5 mg total) by mouth once as needed for migraine for up to 1 dose May repeat in 2 hours if needed (Patient not taking: Reported on 3/30/2021), Disp: 20 tablet, Rfl: 0    sertraline (ZOLOFT) 25 mg tablet, Take 1 tablet (25 mg total) by mouth daily (Patient not taking: Reported on 3/9/2021), Disp: 30 tablet, Rfl: 0    No Known Allergies    Physical Exam:    /77   Pulse 96   Ht 5' 2" (1 575 m)   Wt 64 kg (141 lb)   BMI 25 79 kg/m²     Constitutional:normal, well developed, well nourished, alert, in no distress and non-toxic and no overt pain behavior  Eyes:anicteric  HEENT:grossly intact  Neck:supple, symmetric, trachea midline and no masses   Pulmonary:even and unlabored  Cardiovascular:No edema or pitting edema present  Skin:Normal without rashes or lesions and well hydrated  Psychiatric:Mood and affect appropriate  Neurologic:Cranial Nerves II-XII grossly intact  Musculoskeletal:Range of motion of the lumbar spine is limited in all planes  there are some lumbar paraspinal muscle spasms present  Phalens test is positive bilaterally at about 30 seconds    Tinel's test is positive bilateral    Imaging  No orders to display       Orders Placed This Encounter   Procedures    Ambulatory referral to Physical Therapy    EMG 2 Limb Upper Extremity

## 2021-12-02 DIAGNOSIS — G43.019 INTRACTABLE MIGRAINE WITHOUT AURA AND WITHOUT STATUS MIGRAINOSUS: ICD-10-CM

## 2021-12-02 RX ORDER — RIZATRIPTAN BENZOATE 5 MG/1
5 TABLET, ORALLY DISINTEGRATING ORAL ONCE AS NEEDED
Qty: 20 TABLET | Refills: 0 | Status: SHIPPED | OUTPATIENT
Start: 2021-12-02

## 2022-01-13 ENCOUNTER — TELEPHONE (OUTPATIENT)
Dept: FAMILY MEDICINE CLINIC | Facility: CLINIC | Age: 47
End: 2022-01-13

## 2022-01-13 DIAGNOSIS — B34.9 VIRAL INFECTION, UNSPECIFIED: Primary | ICD-10-CM

## 2022-01-13 PROCEDURE — U0003 INFECTIOUS AGENT DETECTION BY NUCLEIC ACID (DNA OR RNA); SEVERE ACUTE RESPIRATORY SYNDROME CORONAVIRUS 2 (SARS-COV-2) (CORONAVIRUS DISEASE [COVID-19]), AMPLIFIED PROBE TECHNIQUE, MAKING USE OF HIGH THROUGHPUT TECHNOLOGIES AS DESCRIBED BY CMS-2020-01-R: HCPCS | Performed by: PHYSICIAN ASSISTANT

## 2022-01-13 PROCEDURE — U0005 INFEC AGEN DETEC AMPLI PROBE: HCPCS | Performed by: PHYSICIAN ASSISTANT

## 2022-01-13 NOTE — TELEPHONE ENCOUNTER
C/o fever, chills, body aches, fatigue, fever of 103, nausea  Can you order a test or does she need to be seen?

## 2022-01-13 NOTE — TELEPHONE ENCOUNTER
I put a test in for her  If she would like a virtual visit I am more than happy to see her  Recommend symptomatic relief

## 2022-01-19 ENCOUNTER — TELEPHONE (OUTPATIENT)
Dept: FAMILY MEDICINE CLINIC | Facility: CLINIC | Age: 47
End: 2022-01-19

## 2022-01-19 NOTE — TELEPHONE ENCOUNTER
No, she does not need an appointment  Just make sure when she drops off paperwork she writes all the dates that she missed and when she plans to return

## 2022-02-09 DIAGNOSIS — G56.03 CARPAL TUNNEL SYNDROME, BILATERAL: Primary | ICD-10-CM

## 2022-02-16 ENCOUNTER — TELEPHONE (OUTPATIENT)
Dept: PAIN MEDICINE | Facility: MEDICAL CENTER | Age: 47
End: 2022-02-16

## 2022-02-16 ENCOUNTER — PROCEDURE VISIT (OUTPATIENT)
Dept: NEUROLOGY | Facility: CLINIC | Age: 47
End: 2022-02-16
Payer: COMMERCIAL

## 2022-02-16 DIAGNOSIS — G56.03 CARPAL TUNNEL SYNDROME, BILATERAL: ICD-10-CM

## 2022-02-16 PROCEDURE — 95886 MUSC TEST DONE W/N TEST COMP: CPT | Performed by: PHYSICAL MEDICINE & REHABILITATION

## 2022-02-16 PROCEDURE — 95911 NRV CNDJ TEST 9-10 STUDIES: CPT | Performed by: PHYSICAL MEDICINE & REHABILITATION

## 2022-02-16 NOTE — PROGRESS NOTES
EMG 2 Limb Upper Extremity     Date/Time 2/16/2022 11:03 AM     Performed by  Zahida Gill MD     Authorized by Hanna Villegas [place]                [facility] AT [address]  [phone]    Electromyography & Nerve Conduction Studies Report          Full Name: Vernon Cortes Gender: Female  MRN: [id number] YOB: 1975      Visit Date: 2/16/2022 10:27 AM  Age: 40 Years  Examining Physician: Dr Marianne Barros  Referring Physician: Dr Shantel Rucker History: 40 y old [address] female presents with tingling and pain in both hands associated with discolouration of fingers for the last 1 year, worse on the left  Denies radicular symptoms   Had two cervical   injections at the C6 level a few months ago  Temp 32 degrees      Sensory Nerve Conduction Study       Nerve / Sites Rec  Site Onset Lat Peak Lat  Amp Segments Distance Peak Diff Velocity     ms ms µV  cm ms m/s   R Median - Dig II (Antidromic)      Wrist Index 2 8 3 5 37 3 Wrist - Index 13  47      Ref  ?3 5 ? 20 0 Ref  ?50   L Median - Dig II (Antidromic)      Wrist Index 2 4 3 3 69 3 Wrist - Index 13  54      Ref  ?3 5 ? 20 0 Ref  ?50   L Ulnar - Dig V (Antidromic)      Wrist Dig V 2 2 2 9 49 6 Wrist - Dig V 11  50      Ref  ?3 1 ? 17 0 Ref  ?50   R Ulnar - Dig V (Antidromic)  Wrist Dig V 2 0 2 7 46 7 Wrist - Dig V 12  61      Ref  ?2 9  ? 17 0 Ref  ?50   R Radial - Superficial (Antidromic)      Forearm Wrist 1 5 2 2 19 4 Forearm - Wrist 10  69      Ref  ?2 9 ? 15 0 Ref  ?50   L Radial - Superficial (Antidromic)      Forearm Wrist 1 5 2 1 22 9 Forearm - Wrist 10  69      Ref  ?2 9 ? 15 0 Ref  ?50       Motor Nerve Conduction Study       Nerve / Sites Muscle Latency Ref  Amplitude Ref  Segments Distance Lat Diff Velocity Ref       ms ms mV mV  cm ms m/s m/s   R Median - APB      Wrist APB 3 4 ?4 4 11 7 ?4 0 Wrist - APB 7         Elbow APB 6 6  7 4  Elbow - Wrist 19 3 25 58 ?49   L Median - APB      Wrist APB 3 1 ?4 4 6 4 ?4 0 Wrist - APB 7         Elbow APB 6 7  6 6  Elbow - Wrist 20 3 60 55 ?49   R Ulnar - ADM      Wrist ADM 2 1 ?3 3 9 7 ?6 0 Wrist - ADM 7         B  Elbow ADM 4 9  10 3  B  Elbow - Wrist 20 2 79 72 ?49      A  Elbow ADM 6 7  10 0  A  Elbow - B  Elbow 10 1 75 57 ?49   L Ulnar - ADM      Wrist ADM 2 4 ?3 3 12 2 ?6 0 Wrist - ADM 7         B  Elbow ADM 5 4  12 4  B  Elbow - Wrist 20 2 94 68 ?49      A  Elbow ADM 7 7  11 9  A  Elbow - B  Elbow 10 2 29 44 ?52       F Waves       Nerve F Latency Ref  ms ms   R Median - APB 25 1 ? 31 0   R Ulnar - ADM 24 8 ?32 0   L Median - APB 25 1 ? 31 0   L Ulnar - ADM 25 1 ? 32 0       EMG Summary Table     Spontaneous MUAP Recruitment   Muscle Nerve Roots IA Fib PSW Fasc H F  Dur  Amp PPP Config Pattern   L  First dorsal interosseous Ulnar C8-T1 NL None None None None NL NL None NL NL   R  First dorsal interosseous Ulnar C8-T1 NL None None None None NL NL None NL NL   R  Deltoid Axillary C5-C6 NL None None None None NL NL None NL NL   R  Biceps brachii Musculocut  C5-C6 NL None None None None Sl  Incr  NL None NL Reduced   R  Triceps brachii Radial C6-C8 NL None None None None NL NL None NL NL   R  Pronator teres Median C6-C7 NL None None None None Gr  Incr  NL None NL Reduced   R  Abductor pollicis brevis Median D8-U3 NL None None None None NL NL None NL NL   R  Cervical paraspinals (up)  - NL None None None None NL NL None NL NL   L  Deltoid Axillary C5-C6 NL None None None None Sl  Incr  NL None NL Reduced   L  Biceps brachii Musculocut  C5-C6 NL None None None None NL NL None NL NL   L  Triceps brachii Radial C6-C8 NL None None None None NL Gr  Incr  None NL Reduced   L  Abductor pollicis brevis Median C2-P4 NL None None None None NL NL None NL NL   L  Cervical paraspinals (up)  - NL None None None None NL NL None NL NL   L   Flexor carpi ulnaris Ulnar C7-T1 NL None None None None NL NL None NL NL Summary    The left and right median and right ulnar motor conduction velocities and compound muscle action potentials were normal with normal distal latencies across the wrists   The left ulnar motor terminal latency was normal with normal compound motor action potential amplitude and normal conduction velocity distally with slow conduction velocity across the elbow  The left and right median and ulnar sensory conduction velocity and sensory action potentials were also normal with normal distal latencies across the wrists  The bilateral superficial radial sensory action potentials were normal     The left and right median and ulnar F wave latencies were within normal limits  Concentric needle EMG was performed on selected proximal and distal muscles of the bilateral upper extremities  There was no evidence of active denervation in any of the muscles tested  Mild-to-moderate decreased recruitment of giant and polyphasic motor units was noted in the right biceps, pronator teres, left deltoid and triceps  Early recruited motor units appear normal with recruitment patterns being full or full for effort in the remaining muscles tested  Impression:        Abnormal study  There is electrophysiologic evidence of a:    1  Focal demyelination of the ulnar nerve at the elbow without sensory axonal loss, consistent with cubital tunnel syndrome  2  Mild to moderate chronic C6 radiculopathy bilaterally as evidenced by the decreased recruitment and chronic denervation changes in the above-mentioned muscles

## 2022-02-16 NOTE — TELEPHONE ENCOUNTER
----- Message from Ulices Gregory, [address] Chanelle Garsia sent at 2/16/2022 12:41 PM EST -----  Please call patient and let her know that the EMG of her upper extremities reveals:    1  Focal demyelination of the ulnar nerve at the elbow without sensory axonal loss, consistent with cubital tunnel syndrome      2  Mild to moderate chronic C6 radiculopathy bilaterally as evidenced by the decreased recruitment and chronic denervation changes in the above-mentioned muscles  I know she was concerned about carpal tunnel syndrome, however the EMG does not indicate that she has carpal tunnel syndrome  Is advise her to schedule follow-up visit

## 2022-02-23 ENCOUNTER — APPOINTMENT (OUTPATIENT)
Dept: LAB | Facility: MEDICAL CENTER | Age: 47
End: 2022-02-23
Payer: COMMERCIAL

## 2022-02-23 ENCOUNTER — OFFICE VISIT (OUTPATIENT)
Dept: FAMILY MEDICINE CLINIC | Facility: CLINIC | Age: 47
End: 2022-02-23
Payer: COMMERCIAL

## 2022-02-23 VITALS
OXYGEN SATURATION: 99 % | BODY MASS INDEX: 25.54 KG/M2 | HEART RATE: 81 BPM | DIASTOLIC BLOOD PRESSURE: 82 MMHG | SYSTOLIC BLOOD PRESSURE: 116 MMHG | HEIGHT: 62 IN | TEMPERATURE: 99.6 F | WEIGHT: 138.8 LBS

## 2022-02-23 DIAGNOSIS — J35.1 ENLARGED TONSILS: Primary | ICD-10-CM

## 2022-02-23 DIAGNOSIS — J35.1 ENLARGED TONSILS: ICD-10-CM

## 2022-02-23 LAB
BASOPHILS # BLD AUTO: 0.07 THOUSANDS/ΜL (ref 0–0.1)
BASOPHILS NFR BLD AUTO: 1 % (ref 0–1)
EOSINOPHIL # BLD AUTO: 0.02 THOUSAND/ΜL (ref 0–0.61)
EOSINOPHIL NFR BLD AUTO: 0 % (ref 0–6)
ERYTHROCYTE [DISTWIDTH] IN BLOOD BY AUTOMATED COUNT: 12.9 % (ref 11.6–15.1)
HCT VFR BLD AUTO: 40.3 % (ref 34.8–46.1)
HGB BLD-MCNC: 13.2 G/DL (ref 11.5–15.4)
IMM GRANULOCYTES # BLD AUTO: 0.04 THOUSAND/UL (ref 0–0.2)
IMM GRANULOCYTES NFR BLD AUTO: 0 % (ref 0–2)
LYMPHOCYTES # BLD AUTO: 2.68 THOUSANDS/ΜL (ref 0.6–4.47)
LYMPHOCYTES NFR BLD AUTO: 26 % (ref 14–44)
MCH RBC QN AUTO: 32.1 PG (ref 26.8–34.3)
MCHC RBC AUTO-ENTMCNC: 32.8 G/DL (ref 31.4–37.4)
MCV RBC AUTO: 98 FL (ref 82–98)
MONOCYTES # BLD AUTO: 0.52 THOUSAND/ΜL (ref 0.17–1.22)
MONOCYTES NFR BLD AUTO: 5 % (ref 4–12)
NEUTROPHILS # BLD AUTO: 6.88 THOUSANDS/ΜL (ref 1.85–7.62)
NEUTS SEG NFR BLD AUTO: 68 % (ref 43–75)
NRBC BLD AUTO-RTO: 0 /100 WBCS
PLATELET # BLD AUTO: 400 THOUSANDS/UL (ref 149–390)
PMV BLD AUTO: 9.5 FL (ref 8.9–12.7)
RBC # BLD AUTO: 4.11 MILLION/UL (ref 3.81–5.12)
WBC # BLD AUTO: 10.21 THOUSAND/UL (ref 4.31–10.16)

## 2022-02-23 PROCEDURE — 36415 COLL VENOUS BLD VENIPUNCTURE: CPT

## 2022-02-23 PROCEDURE — 86308 HETEROPHILE ANTIBODY SCREEN: CPT

## 2022-02-23 PROCEDURE — 85025 COMPLETE CBC W/AUTO DIFF WBC: CPT

## 2022-02-23 PROCEDURE — 99214 OFFICE O/P EST MOD 30 MIN: CPT | Performed by: PHYSICIAN ASSISTANT

## 2022-02-23 PROCEDURE — 87070 CULTURE OTHR SPECIMN AEROBIC: CPT | Performed by: PHYSICIAN ASSISTANT

## 2022-02-23 PROCEDURE — 87147 CULTURE TYPE IMMUNOLOGIC: CPT | Performed by: PHYSICIAN ASSISTANT

## 2022-02-23 NOTE — PROGRESS NOTES
Assessment/Plan:    Problem List Items Addressed This Visit     None      Visit Diagnoses     Enlarged tonsils    -  Primary    Relevant Orders    CBC and differential    Mononucleosis screen    Throat culture         Diagnoses and all orders for this visit:    Enlarged tonsils  -     CBC and differential; Future  -     Mononucleosis screen; Future  -     Throat culture; Future  -     Throat culture        Tonsils are mildly enlarged  Will send out throat culture and order labs  Normal TMs bilaterally  Tube has fallen out on its own  Likely viral and recommended symptomatic relief  Follow-up if symptoms do not improve or worsen  Subjective:      Patient ID: Tariq Alvarez is a 40 y o  female  Ashia is a pleasant 40year old female who is here today complaining of swollen glands in her neck  She admits that her throat is mildly sore, but she is not having any difficulty swallowing  She denies any recent illnesses  She did have COVID at the end of January  She also started with ear pain in her left ear  She denies any drainage  She did have and ear tube placed about 6-7 years ago, and it recently fell out  She denies any trauma to her ear  She denies any fevers or chills  Sore Throat   This is a new problem  The current episode started in the past 7 days  The problem has been gradually worsening  Neither side of throat is experiencing more pain than the other  There has been no fever  The pain is at a severity of 4/10  Associated symptoms include ear pain and swollen glands  Pertinent negatives include no abdominal pain, congestion, coughing, diarrhea, drooling, ear discharge, headaches, hoarse voice, neck pain, shortness of breath, stridor, trouble swallowing or vomiting  She has had no exposure to strep or mono  The following portions of the patient's history were reviewed and updated as appropriate:   She has no past medical history on file  ,  does not have any pertinent problems on file ,   has a past surgical history that includes Cholecystectomy;  section; Sinus surgery; Epidural block injection (Right, 3/2/2021); FL guided needle plac bx/asp/inj (3/2/2021); Epidural block injection (Right, 3/26/2021); and FL guided needle plac bx/asp/inj (3/26/2021)  ,  family history includes No Known Problems in her father and mother  ,   reports that she has never smoked  She has never used smokeless tobacco  She reports previous alcohol use  No history on file for drug use ,  has No Known Allergies     Current Outpatient Medications   Medication Sig Dispense Refill    rizatriptan (MAXALT-MLT) 5 MG disintegrating tablet Take 1 tablet (5 mg total) by mouth once as needed for migraine for up to 1 dose May repeat in 2 hours if needed 20 tablet 0    sertraline (ZOLOFT) 25 mg tablet Take 1 tablet (25 mg total) by mouth daily 30 tablet 0    acetaminophen (TYLENOL) 325 mg tablet Take 650 mg by mouth every 6 (six) hours as needed for mild pain  (Patient not taking: Reported on 2022 )      acyclovir (ZOVIRAX) 800 mg tablet Take 1 tablet (800 mg total) by mouth every 4 (four) hours while awake for 10 days (Patient not taking: Reported on 2021) 50 tablet 0    cyclobenzaprine (FLEXERIL) 5 mg tablet Take 1 tablet (5 mg total) by mouth daily at bedtime as needed for muscle spasms (Patient not taking: Reported on 3/9/2021) 14 tablet 0    gabapentin (NEURONTIN) 300 mg capsule Take 1 capsule (300 mg total) by mouth 3 (three) times a day (Patient not taking: Reported on 3/9/2021) 90 capsule 1     No current facility-administered medications for this visit  Review of Systems   Constitutional: Negative for chills, diaphoresis, fatigue and fever  HENT: Positive for ear pain and sore throat  Negative for congestion, drooling, ear discharge, hoarse voice, postnasal drip, rhinorrhea, sneezing and trouble swallowing  Eyes: Negative for pain and visual disturbance     Respiratory: Negative for apnea, cough, shortness of breath, wheezing and stridor  Cardiovascular: Negative for chest pain and palpitations  Gastrointestinal: Negative for abdominal pain, constipation, diarrhea, nausea and vomiting  Genitourinary: Negative for dysuria  Musculoskeletal: Negative for arthralgias, gait problem, myalgias and neck pain  Neurological: Negative for dizziness, syncope, weakness, light-headedness, numbness and headaches  Psychiatric/Behavioral: Negative for suicidal ideas  The patient is not nervous/anxious  Objective:  Vitals:    02/23/22 1259   BP: 116/82   Pulse: 81   Temp: 99 6 °F (37 6 °C)   SpO2: 99%   Weight: 63 kg (138 lb 12 8 oz)   Height: 5' 2" (1 575 m)     Body mass index is 25 39 kg/m²  Physical Exam  Vitals and nursing note reviewed  Constitutional:       Appearance: She is well-developed  HENT:      Head: Normocephalic and atraumatic  Right Ear: Tympanic membrane, ear canal and external ear normal       Left Ear: Tympanic membrane, ear canal and external ear normal       Nose: Nose normal       Mouth/Throat:      Pharynx: Posterior oropharyngeal erythema present  No oropharyngeal exudate  Tonsils: No tonsillar exudate or tonsillar abscesses  1+ on the right  1+ on the left  Comments: Tonsils are mildly enlarged bilaterally  No exudate  Mild erythema  Eyes:      Extraocular Movements: Extraocular movements intact  Cardiovascular:      Rate and Rhythm: Normal rate and regular rhythm  Heart sounds: Normal heart sounds  No murmur heard  No friction rub  No gallop  Pulmonary:      Effort: Pulmonary effort is normal  No respiratory distress  Breath sounds: Normal breath sounds  No wheezing or rales  Musculoskeletal:         General: Normal range of motion  Cervical back: Normal range of motion and neck supple  Lymphadenopathy:      Cervical: No cervical adenopathy  Skin:     General: Skin is warm and dry     Neurological:      Mental Status: She is alert and oriented to person, place, and time  Psychiatric:         Behavior: Behavior normal          Thought Content: Thought content normal          Judgment: Judgment normal          Depression Screening and Follow-up Plan: Patient's depression screening was positive with a PHQ-2 score of 4  Their PHQ-9 score was 15  Clincally patient does not have depression  No treatment is required

## 2022-02-24 ENCOUNTER — TELEPHONE (OUTPATIENT)
Dept: ADMINISTRATIVE | Facility: OTHER | Age: 47
End: 2022-02-24

## 2022-02-24 LAB — HETEROPH AB SER QL: NEGATIVE

## 2022-02-24 NOTE — TELEPHONE ENCOUNTER
Upon review of the In Basket request and the patient's chart, initial outreach has been made via fax, please see Contacts section for details        x1 mammo    Thank you  Steve Dial

## 2022-02-24 NOTE — TELEPHONE ENCOUNTER
----- Message from [facility] sent at 2/23/2022  1:06 PM EST -----  Regarding: Mammo  02/23/22 1:06 PM    Jasmeet, our patient Monico Tellez has had Mammogram completed/performed  Please assist in updating the patient chart by making an External outreach to [facility] Imaging facility located in [place], [place]  The date of service is within 4 months      Thank you,  Purnima Roy   [facility]

## 2022-02-24 NOTE — LETTER
Procedure Request Form: Mammogram      Date Requested: 22  Patient: Marisol Tinsley  Patient : 1975   Referring Provider: Cristina Fleischer, PA-C        Date of Procedure ______________________________       The above patient has informed us that they have completed their   most recent Mammogram at your facility  Please complete   this form and attach all corresponding procedure reports/results  Comments __appx 4 mo ago ________________________________________________________  ____________________________________________________________________  ____________________________________________________________________  ____________________________________________________________________    Facility Completing Procedure _________________________________________    Form Completed By (print name) _______________________________________      Signature __________________________________________________________      These reports are needed for  compliance  Please fax this completed form and a copy of the procedure report to our office located at [place] [address] as soon as possible to [phone] gallito Sainz: Phone [phone]    We thank you for your assistance in treating our mutual patient

## 2022-02-24 NOTE — TELEPHONE ENCOUNTER
Upon review of the In Basket request we have found as a result of outreach that patient did not have the requested item(s) completed  see contact info for details    Any additional questions or concerns should be emailed to the Practice Liaisons via [email or web address] email, please do not reply via In Basket      Thank you  Gaurav Pérez

## 2022-02-26 LAB — BACTERIA THROAT CULT: ABNORMAL

## 2022-02-28 DIAGNOSIS — J02.0 STREP PHARYNGITIS: Primary | ICD-10-CM

## 2022-02-28 RX ORDER — AMOXICILLIN 500 MG/1
500 CAPSULE ORAL EVERY 8 HOURS SCHEDULED
Qty: 30 CAPSULE | Refills: 0 | Status: SHIPPED | OUTPATIENT
Start: 2022-02-28 | End: 2022-03-10

## 2022-03-09 ENCOUNTER — LAB (OUTPATIENT)
Dept: LAB | Facility: MEDICAL CENTER | Age: 47
End: 2022-03-09
Payer: COMMERCIAL

## 2022-03-09 DIAGNOSIS — D72.829 LEUKOCYTOSIS, UNSPECIFIED TYPE: Primary | ICD-10-CM

## 2022-03-09 DIAGNOSIS — D72.829 LEUKOCYTOSIS, UNSPECIFIED TYPE: ICD-10-CM

## 2022-03-09 LAB
BASOPHILS # BLD AUTO: 0.07 THOUSANDS/ΜL (ref 0–0.1)
BASOPHILS NFR BLD AUTO: 1 % (ref 0–1)
EOSINOPHIL # BLD AUTO: 0.07 THOUSAND/ΜL (ref 0–0.61)
EOSINOPHIL NFR BLD AUTO: 1 % (ref 0–6)
ERYTHROCYTE [DISTWIDTH] IN BLOOD BY AUTOMATED COUNT: 12.5 % (ref 11.6–15.1)
HCT VFR BLD AUTO: 38.9 % (ref 34.8–46.1)
HGB BLD-MCNC: 13.3 G/DL (ref 11.5–15.4)
IMM GRANULOCYTES # BLD AUTO: 0.01 THOUSAND/UL (ref 0–0.2)
IMM GRANULOCYTES NFR BLD AUTO: 0 % (ref 0–2)
LYMPHOCYTES # BLD AUTO: 2.86 THOUSANDS/ΜL (ref 0.6–4.47)
LYMPHOCYTES NFR BLD AUTO: 45 % (ref 14–44)
MCH RBC QN AUTO: 32 PG (ref 26.8–34.3)
MCHC RBC AUTO-ENTMCNC: 34.2 G/DL (ref 31.4–37.4)
MCV RBC AUTO: 94 FL (ref 82–98)
MONOCYTES # BLD AUTO: 0.49 THOUSAND/ΜL (ref 0.17–1.22)
MONOCYTES NFR BLD AUTO: 8 % (ref 4–12)
NEUTROPHILS # BLD AUTO: 2.81 THOUSANDS/ΜL (ref 1.85–7.62)
NEUTS SEG NFR BLD AUTO: 45 % (ref 43–75)
NRBC BLD AUTO-RTO: 0 /100 WBCS
PLATELET # BLD AUTO: 384 THOUSANDS/UL (ref 149–390)
PMV BLD AUTO: 9.6 FL (ref 8.9–12.7)
RBC # BLD AUTO: 4.16 MILLION/UL (ref 3.81–5.12)
WBC # BLD AUTO: 6.31 THOUSAND/UL (ref 4.31–10.16)

## 2022-03-09 PROCEDURE — 36415 COLL VENOUS BLD VENIPUNCTURE: CPT

## 2022-03-09 PROCEDURE — 85025 COMPLETE CBC W/AUTO DIFF WBC: CPT

## 2022-03-22 ENCOUNTER — OFFICE VISIT (OUTPATIENT)
Dept: FAMILY MEDICINE CLINIC | Facility: CLINIC | Age: 47
End: 2022-03-22
Payer: COMMERCIAL

## 2022-03-22 VITALS
HEART RATE: 86 BPM | WEIGHT: 135 LBS | DIASTOLIC BLOOD PRESSURE: 62 MMHG | SYSTOLIC BLOOD PRESSURE: 116 MMHG | HEIGHT: 62 IN | BODY MASS INDEX: 24.84 KG/M2 | TEMPERATURE: 97.1 F | OXYGEN SATURATION: 98 %

## 2022-03-22 DIAGNOSIS — B37.3 VAGINAL YEAST INFECTION: ICD-10-CM

## 2022-03-22 DIAGNOSIS — J35.1 ENLARGED TONSILS: Primary | ICD-10-CM

## 2022-03-22 DIAGNOSIS — Z87.09 HISTORY OF STREP PHARYNGITIS: ICD-10-CM

## 2022-03-22 PROCEDURE — 87070 CULTURE OTHR SPECIMN AEROBIC: CPT | Performed by: PHYSICIAN ASSISTANT

## 2022-03-22 PROCEDURE — 99214 OFFICE O/P EST MOD 30 MIN: CPT | Performed by: PHYSICIAN ASSISTANT

## 2022-03-22 RX ORDER — FLUCONAZOLE 150 MG/1
150 TABLET ORAL ONCE
Qty: 1 TABLET | Refills: 0 | Status: SHIPPED | OUTPATIENT
Start: 2022-03-22 | End: 2022-03-22

## 2022-03-22 NOTE — PROGRESS NOTES
Assessment/Plan:    Problem List Items Addressed This Visit     None      Visit Diagnoses     Enlarged tonsils    -  Primary    Relevant Orders    Ambulatory Referral to Otolaryngology    Throat culture    History of strep pharyngitis        Relevant Orders    Ambulatory Referral to Otolaryngology    Throat culture    Vaginal yeast infection        Relevant Medications    fluconazole (DIFLUCAN) 150 mg tablet           Diagnoses and all orders for this visit:    Enlarged tonsils  -     Ambulatory Referral to Otolaryngology; Future  -     Throat culture; Future  -     Throat culture    History of strep pharyngitis  -     Ambulatory Referral to Otolaryngology; Future  -     Throat culture; Future  -     Throat culture    Vaginal yeast infection  -     fluconazole (DIFLUCAN) 150 mg tablet; Take 1 tablet (150 mg total) by mouth once for 1 dose        Concerned that strep infection may not have been completely irradicated due to medication non-compliance  Repeat culture done today  Referral placed to ENT for further evaluation  Will treat yeast infection with diflucan    She will notify us if symptoms do not improve or worsen  Subjective:      Patient ID: Christina Lopez is a 40 y o  female  Vivi Tariq is a 40year old female who is here today complaining of swollen glands  She was seen on 2/23/22 for same issue  Throat culture showed 1+ beta hemolytic strep  She was treated with amoxicillin  She admits that she was not compliant with the amoxicillin because she developed a yeast infection  She admits that she still has a fullness in her throat  She denies any pain, difficulty swallowing, congestion, runny nose, ear pain, cough, or acid reflux  She has been trying salt water gargles, but it has not provided much relief  The following portions of the patient's history were reviewed and updated as appropriate:   She has no past medical history on file  ,  does not have any pertinent problems on file  , has a past surgical history that includes Cholecystectomy;  section; Sinus surgery; Epidural block injection (Right, 3/2/2021); FL guided needle plac bx/asp/inj (3/2/2021); Epidural block injection (Right, 3/26/2021); and FL guided needle plac bx/asp/inj (3/26/2021)  ,  family history includes No Known Problems in her father and mother  ,   reports that she has never smoked  She has never used smokeless tobacco  She reports previous alcohol use  No history on file for drug use ,  has No Known Allergies     Current Outpatient Medications   Medication Sig Dispense Refill    rizatriptan (MAXALT-MLT) 5 MG disintegrating tablet Take 1 tablet (5 mg total) by mouth once as needed for migraine for up to 1 dose May repeat in 2 hours if needed 20 tablet 0    sertraline (ZOLOFT) 25 mg tablet Take 1 tablet (25 mg total) by mouth daily 30 tablet 0    acetaminophen (TYLENOL) 325 mg tablet Take 650 mg by mouth every 6 (six) hours as needed for mild pain  (Patient not taking: Reported on 2022 )      acyclovir (ZOVIRAX) 800 mg tablet Take 1 tablet (800 mg total) by mouth every 4 (four) hours while awake for 10 days (Patient not taking: Reported on 2021) 50 tablet 0    cyclobenzaprine (FLEXERIL) 5 mg tablet Take 1 tablet (5 mg total) by mouth daily at bedtime as needed for muscle spasms (Patient not taking: Reported on 3/9/2021) 14 tablet 0    fluconazole (DIFLUCAN) 150 mg tablet Take 1 tablet (150 mg total) by mouth once for 1 dose 1 tablet 0    gabapentin (NEURONTIN) 300 mg capsule Take 1 capsule (300 mg total) by mouth 3 (three) times a day (Patient not taking: Reported on 3/9/2021) 90 capsule 1     No current facility-administered medications for this visit  Review of Systems   Constitutional: Negative for chills, diaphoresis, fatigue and fever  HENT: Negative for congestion, ear pain, postnasal drip, rhinorrhea, sneezing, sore throat and trouble swallowing           Swollen tonsils and discomfort Eyes: Negative for pain and visual disturbance  Respiratory: Negative for apnea, cough, shortness of breath and wheezing  Cardiovascular: Negative for chest pain and palpitations  Gastrointestinal: Negative for abdominal pain, constipation, diarrhea, nausea and vomiting  Genitourinary: Positive for vaginal discharge  Negative for dysuria and hematuria  Musculoskeletal: Negative for arthralgias, gait problem and myalgias  Neurological: Negative for dizziness, syncope, weakness, light-headedness, numbness and headaches  Psychiatric/Behavioral: Negative for suicidal ideas  The patient is not nervous/anxious  Objective:  Vitals:    03/22/22 0832   BP: 116/62   Pulse: 86   Temp: (!) 97 1 °F (36 2 °C)   SpO2: 98%   Weight: 61 2 kg (135 lb)   Height: 5' 2" (1 575 m)     Body mass index is 24 69 kg/m²  Physical Exam  Vitals and nursing note reviewed  Constitutional:       Appearance: She is well-developed  HENT:      Head: Normocephalic and atraumatic  Right Ear: Tympanic membrane, ear canal and external ear normal       Left Ear: Tympanic membrane, ear canal and external ear normal       Nose: Nose normal       Mouth/Throat:      Pharynx: Posterior oropharyngeal erythema (mild erythema on anterior tonsilar pillars) present  No pharyngeal swelling, oropharyngeal exudate or uvula swelling  Tonsils: No tonsillar exudate  2+ on the right  2+ on the left  Comments: Tonsils are mildly enlarged bilaterally  No exudate  No signs of obstruction  Eyes:      Pupils: Pupils are equal, round, and reactive to light  Cardiovascular:      Rate and Rhythm: Normal rate and regular rhythm  Heart sounds: Normal heart sounds  No murmur heard  No friction rub  No gallop  Pulmonary:      Effort: Pulmonary effort is normal  No respiratory distress  Breath sounds: Normal breath sounds  No wheezing or rales  Musculoskeletal:         General: Normal range of motion        Cervical back: Normal range of motion and neck supple  Lymphadenopathy:      Cervical: No cervical adenopathy  Skin:     General: Skin is warm and dry  Neurological:      Mental Status: She is alert and oriented to person, place, and time  Psychiatric:         Behavior: Behavior normal          Thought Content:  Thought content normal          Judgment: Judgment normal

## 2022-03-25 LAB — BACTERIA THROAT CULT: NORMAL

## 2022-05-04 ENCOUNTER — APPOINTMENT (OUTPATIENT)
Dept: LAB | Facility: MEDICAL CENTER | Age: 47
End: 2022-05-04
Payer: COMMERCIAL

## 2022-05-04 ENCOUNTER — HOSPITAL ENCOUNTER (OUTPATIENT)
Dept: ULTRASOUND IMAGING | Facility: HOSPITAL | Age: 47
Discharge: HOME | End: 2022-05-04
Payer: COMMERCIAL

## 2022-05-04 DIAGNOSIS — E04.2 NONTOXIC MULTINODULAR GOITER: ICD-10-CM

## 2022-05-04 LAB
T4 FREE SERPL-MCNC: 0.85 NG/DL (ref 0.76–1.46)
T4 SERPL-MCNC: 9.3 UG/DL (ref 4.7–13.3)
TSH SERPL DL<=0.05 MIU/L-ACNC: 1.45 UIU/ML (ref 0.45–4.5)

## 2022-05-04 PROCEDURE — 84439 ASSAY OF FREE THYROXINE: CPT

## 2022-05-04 PROCEDURE — 86376 MICROSOMAL ANTIBODY EACH: CPT

## 2022-05-04 PROCEDURE — 36415 COLL VENOUS BLD VENIPUNCTURE: CPT

## 2022-05-04 PROCEDURE — 76536 US EXAM OF HEAD AND NECK: CPT

## 2022-05-04 PROCEDURE — 84443 ASSAY THYROID STIM HORMONE: CPT

## 2022-05-04 PROCEDURE — 84436 ASSAY OF TOTAL THYROXINE: CPT

## 2022-05-04 PROCEDURE — 84479 ASSAY OF THYROID (T3 OR T4): CPT

## 2022-05-05 LAB
T3RU NFR SERPL: 25 % (ref 24–39)
THYROPEROXIDASE AB SERPL-ACNC: <8 IU/ML (ref 0–34)

## 2022-05-13 ENCOUNTER — OFFICE VISIT (OUTPATIENT)
Dept: FAMILY MEDICINE CLINIC | Facility: CLINIC | Age: 47
End: 2022-05-13
Payer: COMMERCIAL

## 2022-05-13 ENCOUNTER — TELEPHONE (OUTPATIENT)
Dept: ADMINISTRATIVE | Facility: OTHER | Age: 47
End: 2022-05-13

## 2022-05-13 VITALS
OXYGEN SATURATION: 98 % | HEART RATE: 68 BPM | TEMPERATURE: 98 F | WEIGHT: 131 LBS | DIASTOLIC BLOOD PRESSURE: 78 MMHG | SYSTOLIC BLOOD PRESSURE: 108 MMHG | HEIGHT: 62 IN | BODY MASS INDEX: 24.11 KG/M2

## 2022-05-13 DIAGNOSIS — Z12.31 ENCOUNTER FOR SCREENING MAMMOGRAM FOR BREAST CANCER: ICD-10-CM

## 2022-05-13 DIAGNOSIS — R09.82 POST-NASAL DRIP: ICD-10-CM

## 2022-05-13 DIAGNOSIS — Z12.11 SCREEN FOR COLON CANCER: ICD-10-CM

## 2022-05-13 DIAGNOSIS — F41.1 GENERALIZED ANXIETY DISORDER: Primary | ICD-10-CM

## 2022-05-13 DIAGNOSIS — J33.9 NASAL POLYPS: ICD-10-CM

## 2022-05-13 PROCEDURE — 99214 OFFICE O/P EST MOD 30 MIN: CPT | Performed by: PHYSICIAN ASSISTANT

## 2022-05-13 RX ORDER — FLUTICASONE PROPIONATE 50 MCG
SPRAY, SUSPENSION (ML) NASAL
COMMUNITY
Start: 2022-04-29

## 2022-05-13 RX ORDER — SERTRALINE HYDROCHLORIDE 25 MG/1
25 TABLET, FILM COATED ORAL DAILY
Qty: 30 TABLET | Refills: 0 | Status: SHIPPED | OUTPATIENT
Start: 2022-05-13 | End: 2022-06-22

## 2022-05-13 NOTE — TELEPHONE ENCOUNTER
----- Message from Avani Lee sent at 5/13/2022  8:46 AM EDT -----  05/13/22 8:47 AM    Hello, our patient Anais Tyson has had Pap Smear (HPV) aka Cervical Cancer Screening completed/performed  Please assist in updating the patient chart by making an External outreach to comprehensive women facility located in [address] [place] [address]  The date of service is this past year, states she was told she does not need them?      Thank you,  Avani Lee  [facility]    Note:  In care everywhere patient had a total abdominal Hysterectomy noted

## 2022-05-13 NOTE — LETTER
Procedure Request Form: Cervical Cancer Screening  - does patient still get Pap tests    Date Requested: 22  Patient: Edilma Connell  Patient : 1975   Referring Provider: Oscar Leigh PA-C        Date of Procedure ______________________________       The above patient has informed us that they have completed their   most recent Cervical Cancer Screening at your facility  Please complete   this form and attach all corresponding procedure reports/results  Comments __________________________________________________________  ____________________________________________________________________  ____________________________________________________________________  ____________________________________________________________________    Facility Completing Procedure _________________________________________    Form Completed By (print name) _______________________________________      Signature __________________________________________________________      These reports are needed for  compliance  Please fax this completed form and a copy of the procedure report to our office located at [place] [address] as soon as possible to [phone] gallito Vazquez: Phone [phone]    We thank you for your assistance in treating our mutual patient

## 2022-05-13 NOTE — PROGRESS NOTES
Assessment/Plan:    Problem List Items Addressed This Visit    None     Visit Diagnoses     Generalized anxiety disorder    -  Primary    Relevant Medications    sertraline (ZOLOFT) 25 mg tablet    Encounter for screening mammogram for breast cancer        Relevant Orders    Mammo screening bilateral w 3d & cad    Screen for colon cancer        Relevant Orders    Cologuard    Post-nasal drip        Nasal polyps               Diagnoses and all orders for this visit:    Generalized anxiety disorder  -     sertraline (ZOLOFT) 25 mg tablet; Take 1 tablet (25 mg total) by mouth in the morning  Encounter for screening mammogram for breast cancer  -     Mammo screening bilateral w 3d & cad; Future    Screen for colon cancer  -     Cologuard    Post-nasal drip    Nasal polyps    Other orders  -     fluticasone (FLONASE) 50 mcg/act nasal spray;  (Patient not taking: Reported on 5/13/2022)      Recommended that she start flonase as directed  I believe that her anxiety is the cause of her symptoms and I also believe she has some depression  She will restart her zoloft and we will see her back in one month or sooner if needed  Subjective:      Patient ID: Seble Romero is a 56 y o  female  Lizzie Sanches is a 56year old female who is here today for a follow-up  She was seen about 2 months ago for a swollen feeling in the back of her throat  She had a throat culture and labs, which were normal  Exam was unremarkable  She was referred to ENT for further evaluation  She was seen by Dr Evelyn Harding  He did not see anything concerning  He sent her for labs and a thyroid US, which were normal  He recommended that she start flonase for PND and nasal polyps, but did not recommend any surgical interventions  She was not pleased with this, since she is still having the sensation in the throat  She has not been taking her anxiety medication  She does feel anxious at times   She also has been sleeping more than usual, and has not drive to want to do anything  She is interested in restarting her zoloft  The following portions of the patient's history were reviewed and updated as appropriate:   She has no past medical history on file  ,  does not have any pertinent problems on file  ,   has a past surgical history that includes Cholecystectomy;  section; Sinus surgery; Epidural block injection (Right, 3/2/2021); FL guided needle plac bx/asp/inj (3/2/2021); Epidural block injection (Right, 3/26/2021); and FL guided needle plac bx/asp/inj (3/26/2021)  ,  family history includes No Known Problems in her father and mother  ,   reports that she has never smoked  She has never used smokeless tobacco  She reports previous alcohol use  No history on file for drug use ,  has No Known Allergies     Current Outpatient Medications   Medication Sig Dispense Refill    acetaminophen (TYLENOL) 325 mg tablet Take 650 mg by mouth every 6 (six) hours as needed for mild pain      rizatriptan (MAXALT-MLT) 5 MG disintegrating tablet Take 1 tablet (5 mg total) by mouth once as needed for migraine for up to 1 dose May repeat in 2 hours if needed 20 tablet 0    fluticasone (FLONASE) 50 mcg/act nasal spray  (Patient not taking: Reported on 2022)      sertraline (ZOLOFT) 25 mg tablet Take 1 tablet (25 mg total) by mouth in the morning  30 tablet 0     No current facility-administered medications for this visit  Review of Systems   Constitutional: Negative for chills, diaphoresis, fatigue and fever  HENT: Positive for ear pain (pressure in bilatearl ears)  Negative for congestion, postnasal drip, rhinorrhea, sneezing, sore throat and trouble swallowing  +Tightness in throat  +PND   Eyes: Negative for pain and visual disturbance  Respiratory: Negative for apnea, cough, shortness of breath and wheezing  Cardiovascular: Negative for chest pain and palpitations     Gastrointestinal: Negative for abdominal pain, constipation, diarrhea, nausea and vomiting  Genitourinary: Negative for dysuria and hematuria  Musculoskeletal: Negative for arthralgias, gait problem and myalgias  Neurological: Negative for dizziness, syncope, weakness, light-headedness, numbness and headaches  Psychiatric/Behavioral: Positive for dysphoric mood and sleep disturbance (too much sleep)  Negative for suicidal ideas  The patient is nervous/anxious  Objective:  Vitals:    05/13/22 0840   BP: 108/78   BP Location: Left arm   Patient Position: Sitting   Cuff Size: Standard   Pulse: 68   Temp: 98 °F (36 7 °C)   TempSrc: Temporal   SpO2: 98%   Weight: 59 4 kg (131 lb)   Height: 5' 2" (1 575 m)     Body mass index is 23 96 kg/m²  Physical Exam  Vitals and nursing note reviewed  Constitutional:       Appearance: She is well-developed  HENT:      Head: Normocephalic and atraumatic  Right Ear: Tympanic membrane, ear canal and external ear normal       Left Ear: Tympanic membrane, ear canal and external ear normal       Nose: Nose normal       Mouth/Throat:      Pharynx: No oropharyngeal exudate or posterior oropharyngeal erythema  Eyes:      Pupils: Pupils are equal, round, and reactive to light  Cardiovascular:      Rate and Rhythm: Normal rate and regular rhythm  Heart sounds: Normal heart sounds  No murmur heard  No friction rub  No gallop  Pulmonary:      Effort: Pulmonary effort is normal  No respiratory distress  Breath sounds: Normal breath sounds  No wheezing or rales  Musculoskeletal:         General: Normal range of motion  Cervical back: Normal range of motion and neck supple  Lymphadenopathy:      Cervical: No cervical adenopathy  Skin:     General: Skin is warm and dry  Neurological:      Mental Status: She is alert and oriented to person, place, and time  Psychiatric:         Mood and Affect: Mood is anxious and depressed  Behavior: Behavior normal          Thought Content:  Thought content normal  Thought content does not include homicidal or suicidal ideation  Thought content does not include homicidal or suicidal plan           Judgment: Judgment normal

## 2022-05-13 NOTE — TELEPHONE ENCOUNTER
Upon review of the In Basket request and the patient's chart, initial outreach has been made via fax, please see Contacts section for details       Thank you  Ponce Fall MA

## 2022-05-16 ENCOUNTER — TELEPHONE (OUTPATIENT)
Dept: FAMILY MEDICINE CLINIC | Facility: CLINIC | Age: 47
End: 2022-05-16

## 2022-05-16 NOTE — TELEPHONE ENCOUNTER
Patient states she called off work Sunday and Monday due to all her symptoms, asking if you can give her a note? Please advise

## 2022-05-17 NOTE — TELEPHONE ENCOUNTER
Upon review of the In Basket request we   Found that patient had a CHANEL noted in office note dated 20 from Miranda Nance 89 is a 41 y o  female  who presents for Annual Exam (No concerns today  No mammogram done this year yet  H/o robotic assisted total laparoscopic hysterectomy w/KARLEE   Currently sexually active )    Please exclude at office level - contact liasons for further instructions    Any additional questions or concerns should be emailed to the Practice Liaisons via [email or web address] email, please do not reply via In Basket      Thank you  Checo Carmona MA

## 2022-05-23 ENCOUNTER — VBI (OUTPATIENT)
Dept: ADMINISTRATIVE | Facility: OTHER | Age: 47
End: 2022-05-23

## 2022-06-22 DIAGNOSIS — F41.1 GENERALIZED ANXIETY DISORDER: ICD-10-CM

## 2022-06-22 RX ORDER — SERTRALINE HYDROCHLORIDE 25 MG/1
TABLET, FILM COATED ORAL
Qty: 30 TABLET | Refills: 0 | Status: SHIPPED | OUTPATIENT
Start: 2022-06-22

## 2022-09-08 DIAGNOSIS — F41.1 GENERALIZED ANXIETY DISORDER: ICD-10-CM

## 2022-09-08 RX ORDER — SERTRALINE HYDROCHLORIDE 25 MG/1
TABLET, FILM COATED ORAL
Qty: 30 TABLET | Refills: 0 | Status: SHIPPED | OUTPATIENT
Start: 2022-09-08 | End: 2022-10-26 | Stop reason: SDUPTHER

## 2022-10-26 DIAGNOSIS — F41.1 GENERALIZED ANXIETY DISORDER: ICD-10-CM

## 2022-10-26 RX ORDER — SERTRALINE HYDROCHLORIDE 25 MG/1
25 TABLET, FILM COATED ORAL EVERY MORNING
Qty: 30 TABLET | Refills: 0 | Status: SHIPPED | OUTPATIENT
Start: 2022-10-26

## 2023-01-03 ENCOUNTER — OFFICE VISIT (OUTPATIENT)
Dept: FAMILY MEDICINE CLINIC | Facility: CLINIC | Age: 48
End: 2023-01-03

## 2023-01-03 ENCOUNTER — APPOINTMENT (OUTPATIENT)
Dept: LAB | Facility: MEDICAL CENTER | Age: 48
End: 2023-01-03

## 2023-01-03 VITALS
OXYGEN SATURATION: 98 % | HEART RATE: 65 BPM | BODY MASS INDEX: 25.4 KG/M2 | HEIGHT: 62 IN | DIASTOLIC BLOOD PRESSURE: 60 MMHG | WEIGHT: 138 LBS | SYSTOLIC BLOOD PRESSURE: 110 MMHG

## 2023-01-03 DIAGNOSIS — F41.1 GENERALIZED ANXIETY DISORDER: ICD-10-CM

## 2023-01-03 DIAGNOSIS — Z23 NEED FOR TDAP VACCINATION: ICD-10-CM

## 2023-01-03 DIAGNOSIS — Z00.00 ANNUAL PHYSICAL EXAM: Primary | ICD-10-CM

## 2023-01-03 DIAGNOSIS — Z01.84 IMMUNITY STATUS TESTING: ICD-10-CM

## 2023-01-03 DIAGNOSIS — Z00.00 ANNUAL PHYSICAL EXAM: ICD-10-CM

## 2023-01-03 LAB
ALBUMIN SERPL BCP-MCNC: 4.1 G/DL (ref 3.5–5)
ALP SERPL-CCNC: 57 U/L (ref 46–116)
ALT SERPL W P-5'-P-CCNC: 29 U/L (ref 12–78)
ANION GAP SERPL CALCULATED.3IONS-SCNC: 3 MMOL/L (ref 4–13)
AST SERPL W P-5'-P-CCNC: 24 U/L (ref 5–45)
BASOPHILS # BLD MANUAL: 0.25 THOUSAND/UL (ref 0–0.1)
BASOPHILS NFR MAR MANUAL: 4 % (ref 0–1)
BILIRUB SERPL-MCNC: 0.89 MG/DL (ref 0.2–1)
BUN SERPL-MCNC: 11 MG/DL (ref 5–25)
CALCIUM SERPL-MCNC: 9.2 MG/DL (ref 8.3–10.1)
CHLORIDE SERPL-SCNC: 106 MMOL/L (ref 96–108)
CHOLEST SERPL-MCNC: 235 MG/DL
CO2 SERPL-SCNC: 30 MMOL/L (ref 21–32)
CREAT SERPL-MCNC: 0.79 MG/DL (ref 0.6–1.3)
EOSINOPHIL # BLD MANUAL: 0.13 THOUSAND/UL (ref 0–0.4)
EOSINOPHIL NFR BLD MANUAL: 2 % (ref 0–6)
ERYTHROCYTE [DISTWIDTH] IN BLOOD BY AUTOMATED COUNT: 12.8 % (ref 11.6–15.1)
GFR SERPL CREATININE-BSD FRML MDRD: 90 ML/MIN/1.73SQ M
GLUCOSE P FAST SERPL-MCNC: 84 MG/DL (ref 65–99)
HCT VFR BLD AUTO: 42.3 % (ref 34.8–46.1)
HDLC SERPL-MCNC: 65 MG/DL
HGB BLD-MCNC: 13.4 G/DL (ref 11.5–15.4)
LDLC SERPL CALC-MCNC: 149 MG/DL (ref 0–100)
LYMPHOCYTES # BLD AUTO: 2.98 THOUSAND/UL (ref 0.6–4.47)
LYMPHOCYTES # BLD AUTO: 47 % (ref 14–44)
MACROCYTES BLD QL AUTO: PRESENT
MCH RBC QN AUTO: 31.5 PG (ref 26.8–34.3)
MCHC RBC AUTO-ENTMCNC: 31.7 G/DL (ref 31.4–37.4)
MCV RBC AUTO: 100 FL (ref 82–98)
MONOCYTES # BLD AUTO: 0.51 THOUSAND/UL (ref 0–1.22)
MONOCYTES NFR BLD: 8 % (ref 4–12)
NEUTROPHILS # BLD MANUAL: 2.48 THOUSAND/UL (ref 1.85–7.62)
NEUTS SEG NFR BLD AUTO: 39 % (ref 43–75)
NONHDLC SERPL-MCNC: 170 MG/DL
PLATELET # BLD AUTO: 399 THOUSANDS/UL (ref 149–390)
PLATELET BLD QL SMEAR: ADEQUATE
PMV BLD AUTO: 9.6 FL (ref 8.9–12.7)
POTASSIUM SERPL-SCNC: 3.9 MMOL/L (ref 3.5–5.3)
PROT SERPL-MCNC: 7.9 G/DL (ref 6.4–8.4)
RBC # BLD AUTO: 4.25 MILLION/UL (ref 3.81–5.12)
RBC MORPH BLD: PRESENT
SODIUM SERPL-SCNC: 139 MMOL/L (ref 135–147)
TRIGL SERPL-MCNC: 104 MG/DL
WBC # BLD AUTO: 6.35 THOUSAND/UL (ref 4.31–10.16)

## 2023-01-03 NOTE — PROGRESS NOTES
[facility]    NAME: Nara Kennedy  AGE: 56 y o  SEX: female  : 1975     DATE: 1/3/2023     Assessment and Plan:     Problem List Items Addressed This Visit    None  Visit Diagnoses     Annual physical exam    -  Primary    Relevant Orders    TDAP VACCINE GREATER THAN OR EQUAL TO 8YO IM (Completed)    CBC and differential    Comprehensive metabolic panel    Lipid panel    Immunity status testing        Relevant Orders    Mumps antibody, IgG    Varicella zoster antibody, IgG    Rubella antibody, IgG    Rubeola antibody IgG    Hepatitis B surface antibody    BMI 25 0-25 9,adult        Need for Tdap vaccination        Relevant Orders    TDAP VACCINE GREATER THAN OR EQUAL TO 8YO IM (Completed)    Generalized anxiety disorder        Relevant Medications    sertraline (ZOLOFT) 50 mg tablet        Immunizations and preventive care screenings were discussed with patient today  Appropriate education was printed on patient's after visit summary  Counseling:  Alcohol/drug use: discussed moderation in alcohol intake, the recommendations for healthy alcohol use, and avoidance of illicit drug use  Dental Health: discussed importance of regular tooth brushing, flossing, and dental visits  Injury prevention: discussed safety/seat belts, safety helmets, smoke detectors, carbon dioxide detectors, and smoking near bedding or upholstery  Sexual health: discussed sexually transmitted diseases, partner selection, use of condoms, avoidance of unintended pregnancy, and contraceptive alternatives  Exercise: the importance of regular exercise/physical activity was discussed  Recommend exercise 3-5 times per week for at least 30 minutes  Return in 1 year (on 1/3/2024) for Annual physical      Chief Complaint:     Chief Complaint   Patient presents with   • Annual Exam     Physical and titers needed         History of Present Illness:     Adult Annual Physical   Patient here for a comprehensive physical exam  The patient reports problems - anxiety  She is starting a new job and returning to school, so she has been stressed  She is asking for an increase in her zoloft  She needs an updated Tdap and titers for school  She denies any other concerns  She is due for her routine labs  Diet and Physical Activity  Diet/Nutrition: well balanced diet  Exercise: walking  Depression Screening  PHQ-2/9 Depression Screening    Little interest or pleasure in doing things: 0 - not at all  Feeling down, depressed, or hopeless: 0 - not at all  PHQ-2 Score: 0  PHQ-2 Interpretation: Negative depression screen       General Health  Sleep: sleeps well  Hearing: normal - bilateral   Vision: no vision problems, most recent eye exam <1 year ago and wears glasses  Dental: regular dental visits, brushes teeth twice daily and flosses teeth occasionally  /GYN Health  Up to date with GYN visits     Review of Systems:     Review of Systems   Constitutional: Negative for chills, diaphoresis, fatigue and fever  HENT: Negative for congestion, ear pain, postnasal drip, rhinorrhea, sneezing, sore throat and trouble swallowing  Eyes: Negative for pain and visual disturbance  Respiratory: Negative for apnea, cough, shortness of breath and wheezing  Cardiovascular: Negative for chest pain and palpitations  Gastrointestinal: Negative for abdominal pain, constipation, diarrhea, nausea and vomiting  Genitourinary: Negative for dysuria and hematuria  Musculoskeletal: Negative for arthralgias, gait problem and myalgias  Neurological: Negative for dizziness, syncope, weakness, light-headedness, numbness and headaches  Psychiatric/Behavioral: Negative for suicidal ideas  The patient is nervous/anxious  Past Medical History:     History reviewed  No pertinent past medical history     Past Surgical History:     Past Surgical History:   Procedure Laterality Date   •  SECTION     • CHOLECYSTECTOMY     • EPIDURAL BLOCK INJECTION Right 3/2/2021    Procedure: C7-T1 interlaminar epidural steroid injection;  Surgeon: Amina Mcdonald MD;  Location: [facility];  Service: Pain Management    • EPIDURAL BLOCK INJECTION Right 3/26/2021    Procedure: C7-T1 interlaminar epidural steroid injection;  Surgeon: Amina Mcdonald MD;  Location: [facility];  Service: Pain Management    • FL GUIDED NEEDLE PLAC BX/ASP/INJ  3/2/2021   • FL GUIDED NEEDLE PLAC BX/ASP/INJ  3/26/2021   • SINUS SURGERY        Social History:     Social History     Socioeconomic History   • Marital status: /Civil Union     Spouse name: None   • Number of children: None   • Years of education: None   • Highest education level: None   Occupational History   • None   Tobacco Use   • Smoking status: Never   • Smokeless tobacco: Never   Vaping Use   • Vaping Use: Never used   Substance and Sexual Activity   • Alcohol use: Not Currently   • Drug use: None   • Sexual activity: None   Other Topics Concern   • None   Social History Narrative   • None     Social Determinants of Health     Financial Resource Strain: Not on file   Food Insecurity: Not on file   Transportation Needs: Not on file   Physical Activity: Not on file   Stress: Not on file   Social Connections: Not on file   Intimate Partner Violence: Not on file   Housing Stability: Not on file      Family History:     Family History   Problem Relation Age of Onset   • No Known Problems Mother    • No Known Problems Father       Current Medications:     Current Outpatient Medications   Medication Sig Dispense Refill   • acetaminophen (TYLENOL) 325 mg tablet Take 650 mg by mouth every 6 (six) hours as needed for mild pain     • rizatriptan (MAXALT-MLT) 5 MG disintegrating tablet Take 1 tablet (5 mg total) by mouth once as needed for migraine for up to 1 dose May repeat in 2 hours if needed 20 tablet 0   • sertraline (ZOLOFT) 50 mg tablet Take 1 tablet (50 mg total) by mouth every morning 90 tablet 1     No current facility-administered medications for this visit  Allergies:     No Known Allergies   Physical Exam:     /60   Pulse 65   Ht 5' 2" (1 575 m)   Wt 62 6 kg (138 lb)   SpO2 98%   BMI 25 24 kg/m²     Physical Exam  Vitals and nursing note reviewed  Constitutional:       General: She is not in acute distress  Appearance: She is well-developed  She is not diaphoretic  HENT:      Head: Normocephalic and atraumatic  Right Ear: Hearing, tympanic membrane, ear canal and external ear normal       Left Ear: Hearing, tympanic membrane, ear canal and external ear normal       Nose: Nose normal  No mucosal edema or rhinorrhea  Mouth/Throat:      Pharynx: No oropharyngeal exudate or posterior oropharyngeal erythema  Cardiovascular:      Rate and Rhythm: Normal rate and regular rhythm  Heart sounds: Normal heart sounds  No murmur heard  No friction rub  No gallop  Pulmonary:      Effort: Pulmonary effort is normal  No respiratory distress  Breath sounds: Normal breath sounds  No wheezing or rales  Abdominal:      General: Bowel sounds are normal  There is no distension  Palpations: Abdomen is soft  Tenderness: There is no abdominal tenderness  There is no guarding  Musculoskeletal:         General: Normal range of motion  Cervical back: Normal range of motion and neck supple  Lymphadenopathy:      Cervical: No cervical adenopathy  Skin:     General: Skin is warm and dry  Findings: No rash  Neurological:      Mental Status: She is alert and oriented to person, place, and time  Psychiatric:         Mood and Affect: Mood is anxious  Mood is not depressed  Behavior: Behavior normal          Thought Content:  Thought content normal          Judgment: Judgment normal           Roland Lemus PA-C  [facility]

## 2023-01-03 NOTE — PATIENT INSTRUCTIONS

## 2023-01-04 LAB
MEV IGG SER QL IA: NORMAL
MUV IGG SER QL IA: ABNORMAL
VZV IGG SER QL IA: NORMAL

## 2023-01-05 LAB
HBV SURFACE AB SER-ACNC: 50.99 MIU/ML
RUBV IGG SERPL IA-ACNC: 41.4 IU/ML

## 2023-01-06 DIAGNOSIS — E78.5 HYPERLIPIDEMIA, UNSPECIFIED HYPERLIPIDEMIA TYPE: Primary | ICD-10-CM

## 2023-01-06 RX ORDER — SIMVASTATIN 10 MG
10 TABLET ORAL
Qty: 90 TABLET | Refills: 1 | Status: SHIPPED | OUTPATIENT
Start: 2023-01-06

## 2023-01-11 ENCOUNTER — TELEPHONE (OUTPATIENT)
Dept: FAMILY MEDICINE CLINIC | Facility: CLINIC | Age: 48
End: 2023-01-11

## 2023-01-11 NOTE — TELEPHONE ENCOUNTER
Bill Hurtado left msg to change appt for MMR shot for tomorrow    Called back but could not leave a msg because voicemail box was full

## 2023-01-12 ENCOUNTER — CLINICAL SUPPORT (OUTPATIENT)
Dept: FAMILY MEDICINE CLINIC | Facility: CLINIC | Age: 48
End: 2023-01-12

## 2023-01-12 DIAGNOSIS — Z23 NEED FOR MMR VACCINE: Primary | ICD-10-CM

## 2023-01-20 ENCOUNTER — VBI (OUTPATIENT)
Dept: ADMINISTRATIVE | Facility: OTHER | Age: 48
End: 2023-01-20

## 2023-02-27 DIAGNOSIS — G43.019 INTRACTABLE MIGRAINE WITHOUT AURA AND WITHOUT STATUS MIGRAINOSUS: ICD-10-CM

## 2023-02-27 RX ORDER — RIZATRIPTAN BENZOATE 5 MG/1
5 TABLET, ORALLY DISINTEGRATING ORAL ONCE AS NEEDED
Qty: 20 TABLET | Refills: 0 | Status: SHIPPED | OUTPATIENT
Start: 2023-02-27

## 2023-06-20 ENCOUNTER — OFFICE VISIT (OUTPATIENT)
Dept: URGENT CARE | Facility: CLINIC | Age: 48
End: 2023-06-20
Payer: COMMERCIAL

## 2023-06-20 ENCOUNTER — APPOINTMENT (OUTPATIENT)
Dept: RADIOLOGY | Facility: CLINIC | Age: 48
End: 2023-06-20
Payer: COMMERCIAL

## 2023-06-20 VITALS — HEART RATE: 84 BPM | OXYGEN SATURATION: 100 % | TEMPERATURE: 97.9 F | RESPIRATION RATE: 18 BRPM

## 2023-06-20 DIAGNOSIS — S63.632A SPRAIN OF INTERPHALANGEAL JOINT OF RIGHT MIDDLE FINGER, INITIAL ENCOUNTER: Primary | ICD-10-CM

## 2023-06-20 DIAGNOSIS — M79.641 RIGHT HAND PAIN: ICD-10-CM

## 2023-06-20 PROCEDURE — 99203 OFFICE O/P NEW LOW 30 MIN: CPT | Performed by: PHYSICIAN ASSISTANT

## 2023-06-20 PROCEDURE — 73130 X-RAY EXAM OF HAND: CPT

## 2023-06-20 NOTE — PATIENT INSTRUCTIONS
Finger Sprain   WHAT YOU NEED TO KNOW:   A finger sprain happens when ligaments in your finger or thumb are stretched or torn  Ligaments are the tough tissues that connect bones  Ligaments allow your hands to grasp and pinch  DISCHARGE INSTRUCTIONS:   Return to the emergency department if:   The skin on your injured finger looks bluish or pale (less color than normal)  You have new weakness or numbness in your finger or thumb  It may tingle or burn  You have a splint that you cannot adjust and it feels too tight  Call your doctor if:   You have new or increased swelling or pain in your finger  You have new or increased stiffness when you move your injured finger  You have questions or concerns about your injury or treatment  Medicines:   Prescription pain medicine  may be given  Ask your healthcare provider how to take this medicine safely  Some prescription pain medicines contain acetaminophen  Do not take other medicines that contain acetaminophen without talking to your healthcare provider  Too much acetaminophen may cause liver damage  Prescription pain medicine may cause constipation  Ask your healthcare provider how to prevent or treat constipation  Take your medicine as directed  Contact your healthcare provider if you think your medicine is not helping or if you have side effects  Tell your provider if you are allergic to any medicine  Keep a list of the medicines, vitamins, and herbs you take  Include the amounts, and when and why you take them  Bring the list or the pill bottles to follow-up visits  Carry your medicine list with you in case of an emergency  Care for your finger:   Rest your finger for at least 48 hours  Do not do activities that cause pain  Return to normal activities as directed  Apply ice on your finger to help decrease pain and swelling  Use an ice pack, or put crushed ice in a plastic bag  Cover the bag with a towel before you place it on your finger  Apply ice every hour for 15 to 20 minutes at a time  You may need to apply ice at least 4 to 8 times each day  Continue for as many days as directed  Elevate (raise) your finger above the level of your heart as often as you can  This will help decrease swelling and pain  You can elevate your hand by resting it on a pillow  Use a splint or compression as directed  Compression (tight hold) helps support your finger or thumb as it heals  Tape your injured finger to the finger beside it  Severe sprains may be treated with a splint  A splint prevents your finger from moving while it heals  Ask how long you must wear the splint or tape, and how to apply them  Do exercises as directed  You may be given gentle exercises to begin in a few days  Exercises can help decrease stiffness in your finger or thumb  Exercises also help decrease pain and swelling and improve the movement of your finger or thumb  Check with your healthcare provider before you return to your normal activities or sports  Follow up with your doctor as directed:  Write down any questions you may have to ask at your follow up visits  © Copyright [employer] 2022 Information is for End User's use only and may not be sold, redistributed or otherwise used for commercial purposes  The above information is an  only  It is not intended as medical advice for individual conditions or treatments  Talk to your doctor, nurse or pharmacist before following any medical regimen to see if it is safe and effective for you

## 2023-06-20 NOTE — PROGRESS NOTES
[facility]        NAME: Pat Michelle is a 53 y o  female  : 1975    MRN: [id number]  DATE: 2023  TIME: 1:44 PM    Assessment and Plan   Sprain of interphalangeal joint of right middle finger, initial encounter [A15 [id number]]  1  Sprain of interphalangeal joint of right middle finger, initial encounter        2  Right hand pain  XR hand 3+ vw right            Patient Instructions   Patient Instructions     Finger Sprain   WHAT YOU NEED TO KNOW:   A finger sprain happens when ligaments in your finger or thumb are stretched or torn  Ligaments are the tough tissues that connect bones  Ligaments allow your hands to grasp and pinch  DISCHARGE INSTRUCTIONS:   Return to the emergency department if:   • The skin on your injured finger looks bluish or pale (less color than normal)  • You have new weakness or numbness in your finger or thumb  It may tingle or burn  • You have a splint that you cannot adjust and it feels too tight  Call your doctor if:   • You have new or increased swelling or pain in your finger  • You have new or increased stiffness when you move your injured finger  • You have questions or concerns about your injury or treatment  Medicines:   • Prescription pain medicine  may be given  Ask your healthcare provider how to take this medicine safely  Some prescription pain medicines contain acetaminophen  Do not take other medicines that contain acetaminophen without talking to your healthcare provider  Too much acetaminophen may cause liver damage  Prescription pain medicine may cause constipation  Ask your healthcare provider how to prevent or treat constipation  • Take your medicine as directed  Contact your healthcare provider if you think your medicine is not helping or if you have side effects  Tell your provider if you are allergic to any medicine  Keep a list of the medicines, vitamins, and herbs you take   Include the amounts, and when and why you take them  Bring the list or the pill bottles to follow-up visits  Carry your medicine list with you in case of an emergency  Care for your finger:   • Rest your finger for at least 48 hours  Do not do activities that cause pain  Return to normal activities as directed  • Apply ice on your finger to help decrease pain and swelling  Use an ice pack, or put crushed ice in a plastic bag  Cover the bag with a towel before you place it on your finger  Apply ice every hour for 15 to 20 minutes at a time  You may need to apply ice at least 4 to 8 times each day  Continue for as many days as directed  • Elevate (raise) your finger above the level of your heart as often as you can  This will help decrease swelling and pain  You can elevate your hand by resting it on a pillow  • Use a splint or compression as directed  Compression (tight hold) helps support your finger or thumb as it heals  Tape your injured finger to the finger beside it  Severe sprains may be treated with a splint  A splint prevents your finger from moving while it heals  Ask how long you must wear the splint or tape, and how to apply them  • Do exercises as directed  You may be given gentle exercises to begin in a few days  Exercises can help decrease stiffness in your finger or thumb  Exercises also help decrease pain and swelling and improve the movement of your finger or thumb  Check with your healthcare provider before you return to your normal activities or sports  Follow up with your doctor as directed:  Write down any questions you may have to ask at your follow up visits  © Copyright [employer] 2022 Information is for End User's use only and may not be sold, redistributed or otherwise used for commercial purposes  The above information is an  only  It is not intended as medical advice for individual conditions or treatments   Talk to your doctor, nurse or pharmacist before following any medical regimen to see if it is safe and effective for you  Follow up with PCP in 3-5 days  Proceed to  ER if symptoms worsen  Chief Complaint     Chief Complaint   Patient presents with   • Hand Pain     Pain localized to right mid mp joint flex joint one week ago         History of Present Illness       Patient is a 64-year-old female who presents to the clinic complaining of pain in the dorsal aspect of her right hand for approximately 1 week  She states that she initially felt the pain after moving some  rocks approximately 1 week ago  She states the pain was bothering her slightly  The patient states that yesterday she was trying to flick a bug away with her finger when she felt a pop in her finger  She states that the pain is a sharp pain that is approximately 8 out of 10 and is worse with movement  She denies associated bruising or numbness  Review of Systems   Review of Systems   Musculoskeletal: Positive for arthralgias and joint swelling           Current Medications       Current Outpatient Medications:   •  rizatriptan (MAXALT-MLT) 5 mg disintegrating tablet, Take 1 tablet (5 mg total) by mouth once as needed for migraine for up to 1 dose May repeat in 2 hours if needed, Disp: 20 tablet, Rfl: 0  •  sertraline (ZOLOFT) 50 mg tablet, Take 1 tablet (50 mg total) by mouth every morning, Disp: 90 tablet, Rfl: 1  •  simvastatin (ZOCOR) 10 mg tablet, Take 1 tablet (10 mg total) by mouth daily at bedtime, Disp: 90 tablet, Rfl: 1  •  acetaminophen (TYLENOL) 325 mg tablet, Take 650 mg by mouth every 6 (six) hours as needed for mild pain, Disp: , Rfl:     Current Allergies     Allergies as of 06/20/2023   • (No Known Allergies)            The following portions of the patient's history were reviewed and updated as appropriate: allergies, current medications, past family history, past medical history, past social history, past surgical history and problem list      Past Medical History:   Diagnosis Date   • Anxiety • Hyperlipemia        Past Surgical History:   Procedure Laterality Date   •  SECTION     • CHOLECYSTECTOMY     • EPIDURAL BLOCK INJECTION Right 3/2/2021    Procedure: C7-T1 interlaminar epidural steroid injection;  Surgeon: Jestine Goldmann, MD;  Location: [facility];  Service: Pain Management    • EPIDURAL BLOCK INJECTION Right 3/26/2021    Procedure: C7-T1 interlaminar epidural steroid injection;  Surgeon: Jestine Goldmann, MD;  Location: [facility];  Service: Pain Management    • FL GUIDED NEEDLE PLAC BX/ASP/INJ  3/2/2021   • FL GUIDED NEEDLE PLAC BX/ASP/INJ  3/26/2021   • SINUS SURGERY         Family History   Problem Relation Age of Onset   • No Known Problems Mother    • No Known Problems Father          Medications have been verified  Objective   Pulse 84   Temp 97 9 °F (36 6 °C)   Resp 18   SpO2 100%        Physical Exam     Physical Exam  Constitutional:       Appearance: Normal appearance  HENT:      Mouth/Throat:      Pharynx: No oropharyngeal exudate or posterior oropharyngeal erythema  Musculoskeletal:      Right shoulder: Normal       Right upper arm: Normal       Right elbow: Normal       Right forearm: Normal       Right wrist: Normal       Right hand: Swelling and tenderness present  Decreased range of motion  Normal sensation  There is no disruption of two-point discrimination  Normal capillary refill  Normal pulse  Hands:       Comments: There is mild edema noted at the right second and third metacarpal bones  Patient does have decreased range of motion of flexion, extension, of the right hand  Neurological:      Mental Status: She is alert  I personally reported the x-ray results are reviewed with the patient  There is no acute fracture  The patient was given a DME knuckle brace for comfort  I suggest Tylenol for pain relief  I suggest ice as needed    The patient will follow-up with the PCP or go to the ER if symptoms worsen

## 2023-06-27 ENCOUNTER — OFFICE VISIT (OUTPATIENT)
Dept: FAMILY MEDICINE CLINIC | Facility: CLINIC | Age: 48
End: 2023-06-27
Payer: COMMERCIAL

## 2023-06-27 ENCOUNTER — APPOINTMENT (OUTPATIENT)
Dept: LAB | Facility: MEDICAL CENTER | Age: 48
End: 2023-06-27
Payer: COMMERCIAL

## 2023-06-27 VITALS
DIASTOLIC BLOOD PRESSURE: 66 MMHG | BODY MASS INDEX: 24.77 KG/M2 | SYSTOLIC BLOOD PRESSURE: 122 MMHG | HEART RATE: 69 BPM | HEIGHT: 62 IN | WEIGHT: 134.6 LBS | OXYGEN SATURATION: 94 %

## 2023-06-27 DIAGNOSIS — R11.0 NAUSEA: ICD-10-CM

## 2023-06-27 DIAGNOSIS — K29.70 GASTRITIS WITHOUT BLEEDING, UNSPECIFIED CHRONICITY, UNSPECIFIED GASTRITIS TYPE: Primary | ICD-10-CM

## 2023-06-27 DIAGNOSIS — E78.5 HYPERLIPIDEMIA, UNSPECIFIED HYPERLIPIDEMIA TYPE: ICD-10-CM

## 2023-06-27 PROBLEM — F41.1 GENERALIZED ANXIETY DISORDER: Status: ACTIVE | Noted: 2023-06-27

## 2023-06-27 LAB
ALBUMIN SERPL BCP-MCNC: 3.8 G/DL (ref 3.5–5)
ALP SERPL-CCNC: 55 U/L (ref 46–116)
ALT SERPL W P-5'-P-CCNC: 24 U/L (ref 12–78)
ANION GAP SERPL CALCULATED.3IONS-SCNC: 1 MMOL/L
AST SERPL W P-5'-P-CCNC: 17 U/L (ref 5–45)
BILIRUB SERPL-MCNC: 0.78 MG/DL (ref 0.2–1)
BUN SERPL-MCNC: 15 MG/DL (ref 5–25)
CALCIUM SERPL-MCNC: 9.1 MG/DL (ref 8.3–10.1)
CHLORIDE SERPL-SCNC: 110 MMOL/L (ref 96–108)
CHOLEST SERPL-MCNC: 213 MG/DL
CO2 SERPL-SCNC: 30 MMOL/L (ref 21–32)
CREAT SERPL-MCNC: 0.8 MG/DL (ref 0.6–1.3)
GFR SERPL CREATININE-BSD FRML MDRD: 88 ML/MIN/1.73SQ M
GLUCOSE P FAST SERPL-MCNC: 74 MG/DL (ref 65–99)
HDLC SERPL-MCNC: 55 MG/DL
LDLC SERPL CALC-MCNC: 135 MG/DL (ref 0–100)
NONHDLC SERPL-MCNC: 158 MG/DL
POTASSIUM SERPL-SCNC: 4.2 MMOL/L (ref 3.5–5.3)
PROT SERPL-MCNC: 7.6 G/DL (ref 6.4–8.4)
SODIUM SERPL-SCNC: 141 MMOL/L (ref 135–147)
TRIGL SERPL-MCNC: 115 MG/DL

## 2023-06-27 PROCEDURE — 80061 LIPID PANEL: CPT

## 2023-06-27 PROCEDURE — 36415 COLL VENOUS BLD VENIPUNCTURE: CPT

## 2023-06-27 PROCEDURE — 99214 OFFICE O/P EST MOD 30 MIN: CPT | Performed by: PHYSICIAN ASSISTANT

## 2023-06-27 PROCEDURE — 80053 COMPREHEN METABOLIC PANEL: CPT

## 2023-06-27 RX ORDER — ONDANSETRON 4 MG/1
4 TABLET, FILM COATED ORAL EVERY 8 HOURS PRN
Qty: 20 TABLET | Refills: 1 | Status: SHIPPED | OUTPATIENT
Start: 2023-06-27

## 2023-06-27 RX ORDER — OMEPRAZOLE 40 MG/1
40 CAPSULE, DELAYED RELEASE ORAL
Qty: 30 CAPSULE | Refills: 2 | Status: SHIPPED | OUTPATIENT
Start: 2023-06-27 | End: 2023-12-24

## 2023-06-27 NOTE — ASSESSMENT & PLAN NOTE
Prescription for omeprazole  Recommended that she try to eat a bland diet  Referral placed to gastroenterology  Zofran as needed for nausea

## 2023-06-27 NOTE — PROGRESS NOTES
Name: Curly Dietrich      : 1975      MRN: [id number]  Encounter Provider: Eliane Reid PA-C  Encounter Date: 2023   Encounter department: [address] [place] [address] Road     1  Gastritis without bleeding, unspecified chronicity, unspecified gastritis type  Assessment & Plan:  Prescription for omeprazole  Recommended that she try to eat a bland diet  Referral placed to gastroenterology  Zofran as needed for nausea    Orders:  -     omeprazole (PriLOSEC) 40 MG capsule; Take 1 capsule (40 mg total) by mouth daily before breakfast  -     Ambulatory Referral to Gastroenterology; Future    2  Nausea  Assessment & Plan:  Prescription for Zofran as needed for nausea  Recommended bland diet    Orders:  -     Ambulatory Referral to Gastroenterology; Future  -     ondansetron (ZOFRAN) 4 mg tablet; Take 1 tablet (4 mg total) by mouth every 8 (eight) hours as needed for nausea or vomiting           Subjective      Santos Living is a very pleasant 50-year-old female who is here today complaining of nausea and upper abdominal pain for the past few weeks  She admits that this happens every few months  She admits to some associated acid reflux  This usually depends on what she eats  She has been trying Pepto-Bismol the past few days, but it is not providing much relief  She denies any vomiting, black stools, tarry stools, bloody stools, or unintentional weight loss  She denies any new medications  Review of Systems   Constitutional: Positive for fatigue  Negative for chills, diaphoresis and fever  HENT: Negative for congestion, ear pain, postnasal drip, rhinorrhea, sneezing, sore throat and trouble swallowing  Eyes: Negative for pain and visual disturbance  Respiratory: Negative for apnea, cough, shortness of breath and wheezing  Cardiovascular: Negative for chest pain and palpitations  Gastrointestinal: Positive for abdominal pain and nausea   Negative for constipation, diarrhea and "vomiting  Genitourinary: Negative for dysuria  Musculoskeletal: Negative for arthralgias, gait problem and myalgias  Neurological: Negative for dizziness, syncope, weakness, light-headedness, numbness and headaches  Psychiatric/Behavioral: Negative for suicidal ideas  The patient is not nervous/anxious  Current Outpatient Medications on File Prior to Visit   Medication Sig   • acetaminophen (TYLENOL) 325 mg tablet Take 650 mg by mouth every 6 (six) hours as needed for mild pain   • rizatriptan (MAXALT-MLT) 5 mg disintegrating tablet Take 1 tablet (5 mg total) by mouth once as needed for migraine for up to 1 dose May repeat in 2 hours if needed   • sertraline (ZOLOFT) 50 mg tablet Take 1 tablet (50 mg total) by mouth every morning   • simvastatin (ZOCOR) 10 mg tablet Take 1 tablet (10 mg total) by mouth daily at bedtime       Objective     /66   Pulse 69   Ht 5' 2\" (1 575 m)   Wt 61 1 kg (134 lb 9 6 oz)   SpO2 94%   BMI 24 62 kg/m²     Physical Exam  Vitals and nursing note reviewed  Constitutional:       Appearance: She is well-developed  HENT:      Head: Normocephalic and atraumatic  Right Ear: External ear normal       Left Ear: External ear normal       Nose: Nose normal       Mouth/Throat:      Pharynx: No oropharyngeal exudate or posterior oropharyngeal erythema  Eyes:      Extraocular Movements: Extraocular movements intact  Cardiovascular:      Rate and Rhythm: Normal rate and regular rhythm  Heart sounds: Normal heart sounds  No murmur heard  No friction rub  No gallop  Pulmonary:      Effort: Pulmonary effort is normal  No respiratory distress  Breath sounds: Normal breath sounds  No wheezing or rales  Abdominal:      General: Bowel sounds are normal       Palpations: Abdomen is soft  Tenderness: There is abdominal tenderness (mild epigastric)  There is no guarding or rebound  Musculoskeletal:         General: Normal range of motion        " Cervical back: Normal range of motion and neck supple  Skin:     General: Skin is warm and dry  Neurological:      Mental Status: She is alert and oriented to person, place, and time  Psychiatric:         Behavior: Behavior normal          Thought Content:  Thought content normal          Judgment: Judgment normal        Melony Youngblood PA-C

## 2023-06-28 DIAGNOSIS — E78.5 HYPERLIPIDEMIA, UNSPECIFIED HYPERLIPIDEMIA TYPE: ICD-10-CM

## 2023-06-28 RX ORDER — SIMVASTATIN 20 MG
20 TABLET ORAL
Qty: 90 TABLET | Refills: 1 | Status: SHIPPED | OUTPATIENT
Start: 2023-06-28

## 2023-08-10 ENCOUNTER — TELEPHONE (OUTPATIENT)
Dept: GASTROENTEROLOGY | Facility: CLINIC | Age: 48
End: 2023-08-10

## 2023-11-19 DIAGNOSIS — G43.019 INTRACTABLE MIGRAINE WITHOUT AURA AND WITHOUT STATUS MIGRAINOSUS: ICD-10-CM

## 2023-11-20 RX ORDER — RIZATRIPTAN BENZOATE 5 MG/1
TABLET, ORALLY DISINTEGRATING ORAL
Qty: 8 TABLET | Refills: 0 | Status: SHIPPED | OUTPATIENT
Start: 2023-11-20

## 2024-03-12 DIAGNOSIS — F41.1 GENERALIZED ANXIETY DISORDER: ICD-10-CM

## 2024-05-30 ENCOUNTER — VBI (OUTPATIENT)
Dept: ADMINISTRATIVE | Facility: OTHER | Age: 49
End: 2024-05-30

## 2024-06-04 DIAGNOSIS — F41.1 GENERALIZED ANXIETY DISORDER: ICD-10-CM

## 2024-11-03 DIAGNOSIS — G43.019 INTRACTABLE MIGRAINE WITHOUT AURA AND WITHOUT STATUS MIGRAINOSUS: ICD-10-CM

## 2024-11-04 RX ORDER — RIZATRIPTAN BENZOATE 5 MG/1
5 TABLET, ORALLY DISINTEGRATING ORAL AS NEEDED
Qty: 8 TABLET | Refills: 0 | Status: SHIPPED | OUTPATIENT
Start: 2024-11-04

## 2024-11-04 NOTE — TELEPHONE ENCOUNTER
I sent a courtesy refill for patient. However, she needs an appointment before her next fill. She has not been seen in over a year.

## 2025-01-13 ENCOUNTER — EVALUATION (OUTPATIENT)
Dept: PHYSICAL THERAPY | Facility: CLINIC | Age: 50
End: 2025-01-13
Payer: OTHER MISCELLANEOUS

## 2025-01-13 DIAGNOSIS — R29.898 WEAKNESS OF LEFT SHOULDER: ICD-10-CM

## 2025-01-13 DIAGNOSIS — M25.512 ACUTE PAIN OF LEFT SHOULDER: Primary | ICD-10-CM

## 2025-01-13 PROCEDURE — 97140 MANUAL THERAPY 1/> REGIONS: CPT | Performed by: PHYSICAL THERAPIST

## 2025-01-13 PROCEDURE — 97535 SELF CARE MNGMENT TRAINING: CPT | Performed by: PHYSICAL THERAPIST

## 2025-01-13 PROCEDURE — 97161 PT EVAL LOW COMPLEX 20 MIN: CPT | Performed by: PHYSICAL THERAPIST

## 2025-01-13 PROCEDURE — 97110 THERAPEUTIC EXERCISES: CPT | Performed by: PHYSICAL THERAPIST

## 2025-01-13 NOTE — PROGRESS NOTES
PT Evaluation     Today's date: 2025  Patient name: Arely Burns  : 1975  MRN: [id number]  Referring provider: Aram Lugo DO  Dx:   Encounter Diagnosis     ICD-10-CM    1. Acute pain of left shoulder  M25.512       2. Weakness of left shoulder  R29.898                      Assessment  Impairments: abnormal or restricted ROM, impaired physical strength, lacks appropriate home exercise program and pain with function    Assessment details: The patient is a 50 yo female who presents to physical therapy with signs and symptoms consistent with a L shoulder strain. 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 The day after a difficult patient transfer, she was unable to lift her L arm. She was seen in urgent care. She notes pain only with attempts to lift. She notes difficulty reaching up to wash her hair or brush her teeth. She is currently on light duty at work. Deficits are noted with L shoulder AROM, strength and stability. She would benefit from a course of skilled physical therapy to address deficits in ROM, strength, stability and functional status.      Understanding of Dx/Px/POC: good     Prognosis: good    Goals  STG(4 weeks):             1. Independent with HEP            2. Patient will report a 50% improvement with ADLs            3. Increase AROM L shoulder by 15-20 degrees            4. Increase strength by 1/2 MMT grade     LTG(8 weeks):              1. Eliminate pain with functional activities             2. Increase L UE strength to 4+ to 5/5             3. L shoulder AROM WNL             4. Return to PLOF              5. 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 The following day, she noted weakness in the L shoulder and she couldn't lift her arm. She was seen at [facility] on 1-7-25. An EKG was done to r/o cardiac issues and she was given a sling. She returned the following day for a recheck of the EKG and xrays. She was prescribed naproxen and a muscle relaxer. She states that she is now able to lift her arm higher but it still feels weak. She is scheduled for an orthopedic consult on 1-15-25. 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 She is referred to OPPT. She has difficulty washing her hair and brushing her hair. She is unable to lift overhead.           Not a recurrent problem   Quality of life: good    Patient Goals  Patient goals for therapy: return to work and increased strength    Pain  No pain reported    Social Support    Employment status: working ([employer]- on light duty)  Hand dominance: left    Treatments  Current treatment: medication        Objective     Cervical/Thoracic Screen   Cervical range of motion within normal limits    Active Range of Motion   Left Shoulder   Flexion: 140 degrees   Abduction: 125 degrees   External rotation 0°: 30 degrees   Internal rotation BTB: lumbar     Right Shoulder   Normal active range of motion    Strength/Myotome Testing     Left Shoulder     Planes of Motion   Flexion: 4   Abduction: 4-   External rotation at 0°: 4   Internal rotation at 0°: 4+     Isolated Muscles   Biceps: 4+   Supraspinatus: 4-   Triceps: 4+              Precautions: N/A  HEP issued. Diagnosis, prognosis and PT POC discussed with patient       1/13            Manuals             L shldr PROM JM                                    "   Neuro Re-Ed             SL ER and abd 2x10 ea            Prone T,Y             Prone rows             Scap stab ball on wall                          TB MTP/LTP                          Ther Ex             Wall slides 5\"x10 ea flex and abd                                                                                                       Ther Activity             B shldr flex, scap                          Overhead lift             Floor to waist box lift             [place] carry             Modalities                                            "

## 2025-01-13 NOTE — LETTER
2025    Aram Lugo DO  [address] [place] [id number]    Patient: Arely Burns   YOB: 1975   Date of Visit: 2025     Encounter Diagnosis     ICD-10-CM    1. Acute pain of left shoulder  M25.512       2. Weakness of left shoulder  R29.898           Dear Dr. Lugo:    Thank you for your recent referral of Arely Burns. Please review the attached evaluation summary from Arely's recent visit.     Please verify that you agree with the plan of care by signing the attached order.     If you have any questions or concerns, please do not hesitate to call.     I sincerely appreciate the opportunity to share in the care of one of your patients and hope to have another opportunity to work with you in the near future.       Sincerely,    Jennifer Casiano, PT      Referring Provider:      I certify that I have read the below Plan of Care and certify the need for these services furnished under this plan of treatment while under my care.                    Aram Lugo DO  [address] [place] [id number]  Via Fax: [phone]          PT Evaluation     Today's date: 2025  Patient name: Arely Burns  : 1975  MRN: [id number]  Referring provider: Aram Lugo DO  Dx:   Encounter Diagnosis     ICD-10-CM    1. Acute pain of left shoulder  M25.512       2. Weakness of left shoulder  R29.898                      Assessment  Impairments: abnormal or restricted ROM, impaired physical strength, lacks appropriate home exercise program and pain with function    Assessment details: The patient is a 48 yo female who presents to physical therapy with signs and symptoms consistent with a L shoulder strain. 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 The day after a difficult patient transfer, she was unable to lift her L arm. She was seen in urgent care. She notes pain only with attempts to lift. She notes difficulty reaching up to wash her hair or brush  her teeth. She is currently on light duty at work. Deficits are noted with L shoulder AROM, strength and stability. She would benefit from a course of skilled physical therapy to address deficits in ROM, strength, stability and functional status.      Understanding of Dx/Px/POC: good     Prognosis: good    Goals  STG(4 weeks):             1. Independent with HEP            2. Patient will report a 50% improvement with ADLs            3. Increase AROM L shoulder by 15-20 degrees            4. Increase strength by 1/2 MMT grade     LTG(8 weeks):              1. Eliminate pain with functional activities             2. Increase L UE strength to 4+ to 5/5             3. L shoulder AROM WNL             4. Return to PLOF              5. Return to full duty work      Plan  Patient would benefit from: skilled physical therapy  Planned modality interventions: cryotherapy    Planned therapy interventions: manual therapy, neuromuscular re-education, strengthening, stretching, therapeutic activities, therapeutic exercise and home exercise program    Frequency: 1-2x week  Duration in weeks: 8  Plan of Care beginning date: 1/13/2025  Plan of Care expiration date: 3/10/2025  Treatment plan discussed with: patient        Subjective Evaluation    History of Present Illness  Mechanism of injury: The patient was transferring a resident at the [facility] where she works on 1-5-25. The following day, she noted weakness in the L shoulder and she couldn't lift her arm. She was seen at [facility] on 1-7-25. An EKG was done to r/o cardiac issues and she was given a sling. She returned the following day for a recheck of the EKG and xrays. She was prescribed naproxen and a muscle relaxer. She states that she is now able to lift her arm higher but it still feels weak. She is scheduled for an orthopedic consult on 1-15-25. 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 She is referred to OPPT. She has difficulty washing her  "hair and brushing her hair. She is unable to lift overhead.           Not a recurrent problem   Quality of life: good    Patient Goals  Patient goals for therapy: return to work and increased strength    Pain  No pain reported    Social Support    Employment status: working ([employer] personal care home- on light duty)  Hand dominance: left    Treatments  Current treatment: medication        Objective     Cervical/Thoracic Screen   Cervical range of motion within normal limits    Active Range of Motion   Left Shoulder   Flexion: 140 degrees   Abduction: 125 degrees   External rotation 0°: 30 degrees   Internal rotation BTB: lumbar     Right Shoulder   Normal active range of motion    Strength/Myotome Testing     Left Shoulder     Planes of Motion   Flexion: 4   Abduction: 4-   External rotation at 0°: 4   Internal rotation at 0°: 4+     Isolated Muscles   Biceps: 4+   Supraspinatus: 4-   Triceps: 4+              Precautions: N/A  HEP issued. Diagnosis, prognosis and PT POC discussed with patient       1/13            Manuals             L shldr PROM JM                                      Neuro Re-Ed             SL ER and abd 2x10 ea            Prone T,Y             Prone rows             Scap stab ball on wall                          TB MTP/LTP                          Ther Ex             Wall slides 5\"x10 ea flex and abd                                                                                                       Ther Activity             B shldr flex, scap                          Overhead lift             Floor to waist box lift             [place] carry             Modalities                                                          "

## 2025-01-15 ENCOUNTER — OFFICE VISIT (OUTPATIENT)
Dept: OBGYN CLINIC | Facility: CLINIC | Age: 50
End: 2025-01-15
Payer: OTHER MISCELLANEOUS

## 2025-01-15 ENCOUNTER — APPOINTMENT (OUTPATIENT)
Dept: RADIOLOGY | Facility: MEDICAL CENTER | Age: 50
End: 2025-01-15

## 2025-01-15 VITALS
HEIGHT: 62 IN | WEIGHT: 136 LBS | RESPIRATION RATE: 16 BRPM | BODY MASS INDEX: 25.03 KG/M2 | HEART RATE: 100 BPM | OXYGEN SATURATION: 99 % | TEMPERATURE: 97.7 F

## 2025-01-15 DIAGNOSIS — S46.812A STRAIN OF LEFT TRAPEZIUS MUSCLE, INITIAL ENCOUNTER: ICD-10-CM

## 2025-01-15 DIAGNOSIS — M25.512 ACUTE PAIN OF LEFT SHOULDER: ICD-10-CM

## 2025-01-15 DIAGNOSIS — M75.42 IMPINGEMENT SYNDROME OF LEFT SHOULDER: Primary | ICD-10-CM

## 2025-01-15 PROCEDURE — 73030 X-RAY EXAM OF SHOULDER: CPT

## 2025-01-15 PROCEDURE — 99204 OFFICE O/P NEW MOD 45 MIN: CPT | Performed by: STUDENT IN AN ORGANIZED HEALTH CARE EDUCATION/TRAINING PROGRAM

## 2025-01-15 RX ORDER — CYCLOBENZAPRINE HCL 10 MG
TABLET ORAL
COMMUNITY
Start: 2025-01-07

## 2025-01-15 RX ORDER — NAPROXEN 500 MG/1
TABLET ORAL
COMMUNITY
Start: 2025-01-07

## 2025-01-15 NOTE — LETTER
January 15, 2025     Patient: Arely Burns  YOB: 1975  Date of Visit: 1/15/2025      To Whom it May Concern:    Arely Burns is under my professional care. Arely was seen in my office on 1/15/2025. Arely may return to work with limitations of no heavy lifting greater than 5 pounds until 1/23/2025 . At that point she can return without restrictions.    If you have any questions or concerns, please don't hesitate to call.         Sincerely,          Yossi Harkins MD        CC: No Recipients

## 2025-01-15 NOTE — PROGRESS NOTES
Assessment & Plan  Impingement syndrome of left shoulder  Arely Burns is a 49 y.o. year old female with left shoulder pain due to impingement/bursitis, trap strain.  Her symptoms and motion have significantly improved since her initial injury with physical therapy and anti-inflammatory medications.    We had a shared decision making discussion regarding their shoulder pain.  The etiology of the pain, physical exam findings, and imaging was reviewed with the patient.        We discussed the different treatment options, as well as the advantages and disadvantages of each.     Non operative management options such as oral anti-inflammatories, steroid injections, and physical therapy are effective in decreasing pain, improving function, and often are sufficient to improve their pain and restore function.  The risks of these non operative management options are low, and the benefit is the avoidance of surgery and improvement in pain and function.     We also discussed additional imaging to further evaluate the rotator cuff, such as ultrasound and MRI, as well as potential damage to surrounding structures including the biceps tendon  .    Finally, we discussed surgery as an option for people that do not improve sufficiently with non-operative management, this is often times dependent upon the results of advanced imaging and may include procedures such as arthroscopic debridement of the rotator cuff, possible arthroscopic treatment of any rotator cuff tears or specific treatments for the biceps tendon.  We reviewed the outcomes of surgery as well as the recovery process involved.     At this point, the patient would like to proceed with continued physical therapy and oral anti-inflammatory medication she has prescription for naproxen which I recommend she continue to take. Additionally as for work restrictions I recommend that she avoid heavy lifting greater than 5 pounds for at least the next week and a half an  updated work note was provided.  She plans to go back to full duty next Friday however if she feels at that time she cannot return she should follow-up and see me for an updated work evaluation.         Strain of left trapezius muscle, initial encounter    Orders:    XR shoulder 2+ vw left; Future             General  Chief Complaint   Patient presents with    Left Arm - Clicking, Follow-up     Weakness     Left Shoulder - Follow-up, Clicking        Subjective    Arely Burns is a both hand dominant 49 y.o. female who presents with left shoulder pain     History of Present Illness   The patient complains of left shoulder pain. The pain started 10 days ago with injury. At that time the patient describes trying to transfer a nursing home resident when she felt a pull initially she denied pain however the next morning had significant pain and inability to elevate the arm.  She was evaluated at an urgent care where x-rays were obtained and reported as negative.  She was prescribed naproxen and a muscle relaxant and has started physical therapy.  Overall her pain is significantly improved as has her motion.  She does endorse some soreness and weakness with certain overhead lifting.    The patient rates the pain as a 2/10. The pain is located Lateral and Posterior.The pain is described as Sore. The patient has tried PT and NSAIDS for their problem.  The pain improves with rest. The pain worsens with lifting overhead.    The patient does not have pain at night. The patient does have pain with overhead activity, and does describe weakness.     The pain does not go past the elbow. The patient does have neck pain.The shoulder does not feel unstable. The patient does not have numbness.    [facility] Patient Answers  Failed to redirect to the Timeline version of the [facility] SmartLink.  No Known Allergies  Outpatient Encounter Medications as of 1/15/2025   Medication Sig Dispense Refill    acetaminophen (TYLENOL)  325 mg tablet Take 650 mg by mouth every 6 (six) hours as needed for mild pain      cyclobenzaprine (FLEXERIL) 10 mg tablet TAKE 1 TABLET BY MOUTH EVERY 8 HOURS AS NEEDED FOR TIGHTNESS OR SPASM      naproxen (NAPROSYN) 500 mg tablet TAKE 1 TABLET BY MOUTH TWICE DAILY FOR 2 DAYS, THEN AS NEEDED. TAKE WITH FOOD      ondansetron (ZOFRAN) 4 mg tablet Take 1 tablet (4 mg total) by mouth every 8 (eight) hours as needed for nausea or vomiting 20 tablet 1    rizatriptan (MAXALT-MLT) 5 mg disintegrating tablet Take 1 tablet (5 mg total) by mouth as needed for migraine Take at the onset of migraine; if symptoms continue or return, may take another dose at least 2 hours after first dose. Take no more than 2 doses in a day. 8 tablet 0    sertraline (ZOLOFT) 50 mg tablet TAKE 1 TABLET BY MOUTH IN THE MORNING 90 tablet 1    simvastatin (ZOCOR) 20 mg tablet Take 1 tablet (20 mg total) by mouth daily at bedtime 90 tablet 1    omeprazole (PriLOSEC) 40 MG capsule Take 1 capsule (40 mg total) by mouth daily before breakfast 30 capsule 2     No facility-administered encounter medications on file as of 1/15/2025.     Past Medical History:   Diagnosis Date    Anxiety     Hyperlipemia      Past Surgical History:   Procedure Laterality Date     SECTION      CHOLECYSTECTOMY      EPIDURAL BLOCK INJECTION Right 3/2/2021    Procedure: C7-T1 interlaminar epidural steroid injection;  Surgeon: Tarun Branch MD;  Location: [facility];  Service: Pain Management     EPIDURAL BLOCK INJECTION Right 3/26/2021    Procedure: C7-T1 interlaminar epidural steroid injection;  Surgeon: Tarun Branch MD;  Location: [facility];  Service: Pain Management     FL GUIDED NEEDLE PLAC BX/ASP/INJ  3/2/2021    FL GUIDED NEEDLE PLAC BX/ASP/INJ  3/26/2021    SINUS SURGERY       Family History   Problem Relation Age of Onset    No Known Problems Mother     No Known Problems Father      Social History     Tobacco Use    Smoking status: Never     Smokeless tobacco: Never   Vaping Use    Vaping status: Never Used   Substance Use Topics    Alcohol use: Not Currently           Objective      Vitals:    01/15/25 1405   Pulse: 100   Resp: 16   Temp: 97.7 °F (36.5 °C)   SpO2: 99%     Body mass index is 24.87 kg/m².  Physical Exam    Shoulder Exam    Affected shoulder:   left    Skin: Without ecchymosis, wounds or prior incisions    Tenderness:  None    Range of Motion (active/passive):  Side Active (FE/ER/IR) Passive (FE/ER/IR)   left 170/50/Lumbar 170/50   right 170/50/Lumbar 170/50       Rotator Cuff Strength:  Side Supraspinatus Infraspinatus/Teres Subscapularis   left 4/5 5/5 5/5   right 5/5 5/5 5/5       Impingement and Rotator Cuff Exam:  Neer's test for impingement Positive   Doan' test for impingement Positive   Trae's test Mild pain   Belly Press Negative     Biceps Exam:  Eastland's test for SLAP tear: Negative   Jergeson's test for biciptal pain: Negative   Speeds test for biciptal pain: Negative       Shoulder Instability Exam:  The apprehension test for anterior instability: Negative   The relocation test: Negative   The posterior load and shift test: Negative         Cervical Spine Exam:  Tenderness: Left trap  ROM: WBL  Spurlings: Negative    Distally the patient's neurovascular status is normal.    Additional exam: NA    Review of Prior Testing:    I independently interpreted the following test:     left shoulder x-ray images done on 01/15/25 :  Multiple views show no fractures, no dislocations no degenerative changes.  There appears to be a small ossicle off the posterior inferior glenoid labrum which is well-corticated           Follow Up: Return in about 6 weeks (around 2/26/2025).    All questions answered and patient agrees with plan.

## 2025-01-16 ENCOUNTER — OFFICE VISIT (OUTPATIENT)
Dept: PHYSICAL THERAPY | Facility: CLINIC | Age: 50
End: 2025-01-16
Payer: OTHER MISCELLANEOUS

## 2025-01-16 DIAGNOSIS — R29.898 WEAKNESS OF LEFT SHOULDER: ICD-10-CM

## 2025-01-16 DIAGNOSIS — M25.512 ACUTE PAIN OF LEFT SHOULDER: Primary | ICD-10-CM

## 2025-01-16 PROCEDURE — 97140 MANUAL THERAPY 1/> REGIONS: CPT

## 2025-01-16 PROCEDURE — 97112 NEUROMUSCULAR REEDUCATION: CPT

## 2025-01-16 PROCEDURE — 97110 THERAPEUTIC EXERCISES: CPT

## 2025-01-16 NOTE — PROGRESS NOTES
"Daily Note     Today's date: 2025  Patient name: Arely Burns  : 1975  MRN: [id number]  Referring provider: Aram Lugo DO  Dx:   Encounter Diagnosis     ICD-10-CM    1. Acute pain of left shoulder  M25.512       2. Weakness of left shoulder  R29.898                      Subjective: Patient reports that she feels that her ROM has improved since her initial session. She still experiences crepitus and her main limitation is with fixing her hair.       Objective: See treatment diary below.       Assessment: Tolerated treatment well. Patient would benefit from continued PT to improve L shoulder ROM, strength, and stability to perform functional activities easier and w/o limitations. She has strength deficits in all planes at this time. Will progress to tolerance.       Plan: Continue per plan of care.      Precautions: N/A  HEP issued. Diagnosis, prognosis and PT POC discussed with patient              Manuals         L shldr PROM JM CM                         Neuro Re-Ed         SL ER and abd 2x10 ea 1# 2x10 ea       Prone T,Y  10x ea       Prone rows  2x10       Scap stab ball on wall         Carlos ER  L2 2x10       TB MTP/LTP  L3 2x10                Ther Ex         Wall slides 5\"x10 ea flex and abd 5\"x20 ea flex and abd       Pulleys   3'       Stick flexion/ER  5\"x20       IR stretch                                             Ther Activity         B shldr flex, scap                  Overhead lift         Floor to waist box lift         [place] carry         Modalities         MHP  Post TE in sitting 10'                     "

## 2025-01-20 ENCOUNTER — APPOINTMENT (OUTPATIENT)
Dept: PHYSICAL THERAPY | Facility: CLINIC | Age: 50
End: 2025-01-20
Payer: OTHER MISCELLANEOUS

## 2025-01-21 ENCOUNTER — OFFICE VISIT (OUTPATIENT)
Dept: PHYSICAL THERAPY | Facility: CLINIC | Age: 50
End: 2025-01-21
Payer: OTHER MISCELLANEOUS

## 2025-01-21 DIAGNOSIS — M25.512 ACUTE PAIN OF LEFT SHOULDER: Primary | ICD-10-CM

## 2025-01-21 DIAGNOSIS — R29.898 WEAKNESS OF LEFT SHOULDER: ICD-10-CM

## 2025-01-21 PROCEDURE — 97110 THERAPEUTIC EXERCISES: CPT | Performed by: PHYSICAL THERAPIST

## 2025-01-21 PROCEDURE — 97112 NEUROMUSCULAR REEDUCATION: CPT | Performed by: PHYSICAL THERAPIST

## 2025-01-21 PROCEDURE — 97140 MANUAL THERAPY 1/> REGIONS: CPT | Performed by: PHYSICAL THERAPIST

## 2025-01-21 NOTE — PROGRESS NOTES
"Daily Note     Today's date: 2025  Patient name: Arely Burns  : 1975  MRN: [id number]  Referring provider: Aram Lugo DO  Dx:   Encounter Diagnosis     ICD-10-CM    1. Acute pain of left shoulder  M25.512       2. Weakness of left shoulder  R29.898           Start Time: 1600  Stop Time: 1645  Total time in clinic (min): 45 minutes    Subjective: The patient reports L shoulder stiffness but denies pain.       Objective: See treatment diary below      Assessment: Patient presents with rounded shoulders. Instructed in corner pec stretch. Occasional verbal cues needed to avoid shoulder elevation with scapular retraction. Tolerated treatment well. Patient would benefit from continued PT      Plan: Continue per plan of care.      Precautions: N/A  HEP issued. Diagnosis, prognosis and PT POC discussed with patient             Manuals         L shldr PROM JM CM JM                        Neuro Re-Ed         SL ER and abd 2x10 ea 1# 2x10 ea 1# 2x10 ea      Prone T,Y  10x ea 1# 2x10 ea      Prone rows  2x10 1# 20x      Scap stab ball on wall         Carlos ER  L2 2x10 L2 2x10      TB MTP/LTP  L3 2x10 L3 2x10               Ther Ex         Wall slides 5\"x10 ea flex and abd 5\"x20 ea flex and abd  5\"x10 ea w/ lift off flex and abd      Pulleys   3' 3'      Stick flexion/ER  5\"x20       IR stretch                  Pec stretch   30\"x3 corner               UBE retro for scapular mobility   L1 5'      Ther Activity         B shldr flex, scap                  Overhead lift         Floor to waist box lift         [place] carry         Modalities         MHP  Post TE in sitting 10' Declined                      "

## 2025-01-22 ENCOUNTER — OFFICE VISIT (OUTPATIENT)
Dept: PHYSICAL THERAPY | Facility: CLINIC | Age: 50
End: 2025-01-22
Payer: OTHER MISCELLANEOUS

## 2025-01-22 DIAGNOSIS — M25.512 ACUTE PAIN OF LEFT SHOULDER: Primary | ICD-10-CM

## 2025-01-22 DIAGNOSIS — R29.898 WEAKNESS OF LEFT SHOULDER: ICD-10-CM

## 2025-01-22 PROCEDURE — 97110 THERAPEUTIC EXERCISES: CPT | Performed by: PHYSICAL THERAPIST

## 2025-01-22 PROCEDURE — 97140 MANUAL THERAPY 1/> REGIONS: CPT | Performed by: PHYSICAL THERAPIST

## 2025-01-22 PROCEDURE — 97112 NEUROMUSCULAR REEDUCATION: CPT | Performed by: PHYSICAL THERAPIST

## 2025-01-22 NOTE — PROGRESS NOTES
"Daily Note     Today's date: 2025  Patient name: Arely Burns  : 1975  MRN: [id number]  Referring provider: Aram Lugo DO  Dx:   Encounter Diagnosis     ICD-10-CM    1. Acute pain of left shoulder  M25.512       2. Weakness of left shoulder  R29.898           Start Time: 0900  Stop Time: 0945  Total time in clinic (min): 45 minutes    Subjective: Patient is returning to work on Friday with no restrictions. She reports stiffness in the L shoulder today.       Objective: See treatment diary below      Assessment: The patient continues to progress well with scapular stabilization. Mild end range tightness present with L shoulder IR and flexion. Tolerated treatment well. Patient would benefit from continued PT      Plan: Continue per plan of care.      Precautions: N/A  HEP issued. Diagnosis, prognosis and PT POC discussed with patient            Manuals         L shldr PROM JM CM JM JM                       Neuro Re-Ed         SL ER and abd 2x10 ea 1# 2x10 ea 1# 2x10 ea 1# 2x10 ea     Prone T,Y  10x ea 1# 2x10 ea 1# 2x10 ea     Prone rows  2x10 1# 20x 1# 20x     Scap stab ball on wall    20x ea CW/CCW     Carlos ER  L2 2x10 L2 2x10 L3 2x10     TB MTP/LTP  L3 2x10 L3 2x10 L3 2x10              Ther Ex         Wall slides 5\"x10 ea flex and abd 5\"x20 ea flex and abd  5\"x10 ea w/ lift off flex and abd 5\"x10 ea w/ lift off flex,abd     Pulleys   3' 3' 3'     Stick flexion/ER  5\"x20       IR stretch                  Pec stretch   30\"x3 corner 30\"x3 corner              UBE retro for scapular mobility   L1 5' L 5'      Ther Activity         B shldr flex, scap                  Overhead lift         Floor to waist box lift         [place] carry         Modalities         MHP  Post TE in sitting 10' Declined Post TE in sitting 10'                       "

## 2025-01-27 ENCOUNTER — APPOINTMENT (OUTPATIENT)
Dept: PHYSICAL THERAPY | Facility: CLINIC | Age: 50
End: 2025-01-27
Payer: OTHER MISCELLANEOUS

## 2025-01-29 ENCOUNTER — APPOINTMENT (OUTPATIENT)
Dept: PHYSICAL THERAPY | Facility: CLINIC | Age: 50
End: 2025-01-29
Payer: OTHER MISCELLANEOUS

## 2025-01-29 NOTE — PROGRESS NOTES
PT Discharge    Today's date: 2025  Patient name: Arely Burns  : 1975  MRN: [id number]  Referring provider: Aram Lugo DO  Dx:   Encounter Diagnosis     ICD-10-CM    1. Acute pain of left shoulder  M25.512       2. Weakness of left shoulder  R29.898           Start Time: 0900  Stop Time: 0945  Total time in clinic (min): 45 minutes    Assessment  Impairments: abnormal or restricted ROM, impaired physical strength, lacks appropriate home exercise program and pain with function    Assessment details: The patient attended 4 PT sessions.  L shoulder AROM improved and the patient states that she is able to perform her ADLs with minimal difficulty. Deficits remain in shoulder strength and stability. She has been released back to full work duty and requests discharge from physical therapy at this time.     Goals  STG(4 weeks):             1. Independent with HEP  MET            2. Patient will report a 50% improvement with ADLs  MET            3. Increase AROM L shoulder by 15-20 degrees  MET            4. Increase strength by 1/2 MMT grade PARTIALLY MET     LTG(8 weeks): UNABLE TO REASSESS DUE TO SELF D/C             1. Eliminate pain with functional activities             2. Increase L UE strength to 4+ to 5/5             3. L shoulder AROM WNL             4. Return to PLOF              5. 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 The following day, she noted weakness in the L shoulder and she couldn't lift her arm. She was seen at [facility] on 25. An EKG was done to r/o cardiac issues and she was given a sling. She returned the following day for a recheck of the EKG and xrays. She was prescribed naproxen and a muscle relaxer. She states that she is now able to lift her arm higher  but it still feels weak. She is scheduled for an orthopedic consult on 1-15-25. 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 She is referred to OPPT. She has difficulty washing her hair and brushing her hair. She is unable to lift overhead.           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)  Hand dominance: left    Treatments  Current treatment: medication        Objective     Cervical/Thoracic Screen   Cervical range of motion within normal limits    Active Range of Motion   Left Shoulder   Flexion: 140 degrees   Abduction: 125 degrees   External rotation 0°: 30 degrees   Internal rotation BTB: lumbar     Right Shoulder   Normal active range of motion    Strength/Myotome Testing     Left Shoulder     Planes of Motion   Flexion: 4   Abduction: 4-   External rotation at 0°: 4   Internal rotation at 0°: 4+     Isolated Muscles   Biceps: 4+   Supraspinatus: 4-   Triceps: 4+              Precautions: N/A  HEP issued. Diagnosis, prognosis and PT POC discussed with patient

## 2025-01-31 ENCOUNTER — APPOINTMENT (OUTPATIENT)
Dept: PHYSICAL THERAPY | Facility: CLINIC | Age: 50
End: 2025-01-31
Payer: OTHER MISCELLANEOUS

## 2025-02-01 DIAGNOSIS — F41.1 GENERALIZED ANXIETY DISORDER: ICD-10-CM

## 2025-02-21 DIAGNOSIS — G43.019 INTRACTABLE MIGRAINE WITHOUT AURA AND WITHOUT STATUS MIGRAINOSUS: ICD-10-CM

## 2025-02-24 RX ORDER — RIZATRIPTAN BENZOATE 5 MG/1
5 TABLET, ORALLY DISINTEGRATING ORAL AS NEEDED
Qty: 8 TABLET | Refills: 0 | Status: SHIPPED | OUTPATIENT
Start: 2025-02-24

## 2025-02-24 NOTE — TELEPHONE ENCOUNTER
I sent a refill for patient, however, she has not been seen since June 2023. She will need to schedule an appointment before any future refills.

## 2025-03-03 ENCOUNTER — OFFICE VISIT (OUTPATIENT)
Dept: FAMILY MEDICINE CLINIC | Facility: CLINIC | Age: 50
End: 2025-03-03
Payer: COMMERCIAL

## 2025-03-03 VITALS
RESPIRATION RATE: 20 BRPM | OXYGEN SATURATION: 98 % | DIASTOLIC BLOOD PRESSURE: 78 MMHG | SYSTOLIC BLOOD PRESSURE: 110 MMHG | BODY MASS INDEX: 25.54 KG/M2 | HEIGHT: 62 IN | WEIGHT: 138.8 LBS | TEMPERATURE: 97.8 F | HEART RATE: 74 BPM

## 2025-03-03 DIAGNOSIS — R07.9 CHEST PAIN, UNSPECIFIED TYPE: ICD-10-CM

## 2025-03-03 DIAGNOSIS — F41.1 GENERALIZED ANXIETY DISORDER: ICD-10-CM

## 2025-03-03 DIAGNOSIS — J01.90 ACUTE NON-RECURRENT SINUSITIS, UNSPECIFIED LOCATION: Primary | ICD-10-CM

## 2025-03-03 PROCEDURE — 99214 OFFICE O/P EST MOD 30 MIN: CPT | Performed by: PHYSICIAN ASSISTANT

## 2025-03-03 RX ORDER — SERTRALINE HYDROCHLORIDE 100 MG/1
100 TABLET, FILM COATED ORAL EVERY MORNING
Qty: 30 TABLET | Refills: 0 | Status: SHIPPED | OUTPATIENT
Start: 2025-03-03

## 2025-03-03 NOTE — PROGRESS NOTES
Name: Arely Burns      : 1975      MRN: [id number]  Encounter Provider: Reba Robert PA-C  Encounter Date: 3/3/2025   Encounter department: [facility]  :  Assessment & Plan  Acute non-recurrent sinusitis, unspecified location  Given Zithromax, take until finished.  Orders:  •  amoxicillin-clavulanate (AUGMENTIN) 875-125 mg per tablet; Take 1 tablet by mouth every 12 (twelve) hours for 10 days    Chest pain, unspecified type  Check cardiac testing. If worsens or does not resolve after few mins, pt instructed to go directly to ER.  Orders:  •  Echo stress test, exercise; Future    Generalized anxiety disorder  Increase sertraline to 100 mg daily. Pt to RTO 1 month for follow up.  Orders:  •  sertraline (ZOLOFT) 100 mg tablet; Take 1 tablet (100 mg total) by mouth every morning           History of Present Illness   Arely is here today due to severe sinus pressure/congestion, HA, dental pain. Works at nursing home.  She also notes that over the past 2 months, has central CP that radiates to her back, worsens with stress, sometimes feels SOB, also worsens when taking deep breaths. Pt states her mother wanted her to go to the ER but pt did not go as she does not have time.      Review of Systems   Constitutional:  Negative for chills and fever.   HENT:  Positive for congestion, rhinorrhea, sinus pressure and sinus pain. Negative for ear pain and sore throat.    Eyes:  Negative for pain and visual disturbance.   Respiratory:  Positive for shortness of breath. Negative for cough.    Cardiovascular:  Positive for chest pain. Negative for palpitations.   Gastrointestinal:  Negative for abdominal pain and vomiting.   Genitourinary:  Negative for dysuria and hematuria.   Musculoskeletal:  Negative for arthralgias and back pain.   Skin:  Negative for color change and rash.   Neurological:  Positive for headaches. Negative for seizures and syncope.   Psychiatric/Behavioral:  The patient is  "nervous/anxious.    All other systems reviewed and are negative.      Objective   /78 (BP Location: Left arm, Patient Position: Sitting, Cuff Size: Standard)   Pulse 74   Temp 97.8 °F (36.6 °C) (Temporal)   Resp 20   Ht 5' 2\" (1.575 m)   Wt 63 kg (138 lb 12.8 oz)   SpO2 98%   BMI 25.39 kg/m²      Physical Exam  Vitals reviewed.   Constitutional:       General: She is not in acute distress.     Appearance: She is well-developed. She is not diaphoretic.   HENT:      Head: Normocephalic and atraumatic.      Right Ear: Hearing, tympanic membrane, ear canal and external ear normal.      Left Ear: Hearing, tympanic membrane, ear canal and external ear normal.      Nose: Congestion and rhinorrhea present.      Mouth/Throat:      Mouth: Mucous membranes are moist.      Pharynx: Oropharynx is clear. Uvula midline. No oropharyngeal exudate.   Eyes:      General: No scleral icterus.        Right eye: No discharge.         Left eye: No discharge.      Conjunctiva/sclera: Conjunctivae normal.   Neck:      Thyroid: No thyromegaly.      Vascular: No carotid bruit.   Cardiovascular:      Rate and Rhythm: Normal rate and regular rhythm.      Heart sounds: Normal heart sounds. No murmur heard.  Pulmonary:      Effort: Pulmonary effort is normal. No respiratory distress.      Breath sounds: Normal breath sounds. No wheezing.   Abdominal:      General: Bowel sounds are normal. There is no distension.      Palpations: Abdomen is soft. There is no mass.      Tenderness: There is no abdominal tenderness. There is no guarding or rebound.   Musculoskeletal:         General: No tenderness. Normal range of motion.      Cervical back: Neck supple.   Lymphadenopathy:      Cervical: No cervical adenopathy.   Skin:     General: Skin is warm and dry.      Findings: No erythema or rash.   Neurological:      Mental Status: She is alert and oriented to person, place, and time.   Psychiatric:         Behavior: Behavior normal.         " Thought Content: Thought content normal.         Judgment: Judgment normal.

## 2025-03-03 NOTE — ASSESSMENT & PLAN NOTE
Increase sertraline to 100 mg daily. Pt to RTO 1 month for follow up.  Orders:  •  sertraline (ZOLOFT) 100 mg tablet; Take 1 tablet (100 mg total) by mouth every morning

## 2025-03-18 ENCOUNTER — HOSPITAL ENCOUNTER (OUTPATIENT)
Dept: NON INVASIVE DIAGNOSTICS | Facility: HOSPITAL | Age: 50
Discharge: HOME | End: 2025-03-18
Payer: COMMERCIAL

## 2025-03-18 VITALS
HEIGHT: 62 IN | BODY MASS INDEX: 25.56 KG/M2 | HEART RATE: 86 BPM | WEIGHT: 138.89 LBS | SYSTOLIC BLOOD PRESSURE: 116 MMHG | DIASTOLIC BLOOD PRESSURE: 88 MMHG

## 2025-03-18 DIAGNOSIS — R07.9 CHEST PAIN, UNSPECIFIED TYPE: ICD-10-CM

## 2025-03-18 LAB
AORTIC ROOT: 2.9 CM
CHEST PAIN STATEMENT: NORMAL
E WAVE DECELERATION TIME: 160 MS
E/A RATIO: 0.71
FRACTIONAL SHORTENING: 27 (ref 28–44)
INTERVENTRICULAR SEPTUM IN DIASTOLE (PARASTERNAL SHORT AXIS VIEW): 0.9 CM
INTERVENTRICULAR SEPTUM: 0.9 CM (ref 0.6–1.1)
LEFT ATRIUM SIZE: 2.9 CM
LEFT INTERNAL DIMENSION IN SYSTOLE: 2.7 CM (ref 2.1–4)
LEFT VENTRICLE DIASTOLIC VOLUME (MOD BIPLANE): 58 ML
LEFT VENTRICLE SYSTOLIC VOLUME (MOD BIPLANE): 24 ML
LEFT VENTRICULAR INTERNAL DIMENSION IN DIASTOLE: 3.7 CM (ref 3.5–6)
LEFT VENTRICULAR POSTERIOR WALL IN END DIASTOLE: 1 CM
LEFT VENTRICULAR STROKE VOLUME: 31 ML
LV EF BIPLANE MOD: 59 %
LV EF US.2D.A4C+ESTIMATED: 59 %
LVSV (TEICH): 31 ML
MAX DIASTOLIC BP: 86 MMHG
MAX HR PERCENT: 94 %
MAX HR: 162 BPM
MAX PREDICTED HEART RATE: 172 BPM
MV E'TISSUE VEL-LAT: 11 CM/S
MV E'TISSUE VEL-SEP: 10 CM/S
MV PEAK A VEL: 0.83 M/S
MV PEAK E VEL: 59 CM/S
MV STENOSIS PRESSURE HALF TIME: 46 MS
MV VALVE AREA P 1/2 METHOD: 4.78
PROTOCOL NAME: NORMAL
RATE PRESSURE PRODUCT: NORMAL
REASON FOR TERMINATION: NORMAL
SL CV LV EF: 60
SL CV PED ECHO LEFT VENTRICLE DIASTOLIC VOLUME (MOD BIPLANE) 2D: 59 ML
SL CV PED ECHO LEFT VENTRICLE SYSTOLIC VOLUME (MOD BIPLANE) 2D: 28 ML
SL CV STRESS RECOVERY BP: NORMAL MMHG
SL CV STRESS RECOVERY HR: 85 BPM
SL CV STRESS RECOVERY O2 SAT: 99 %
SL CV STRESS STAGE REACHED: 3
STRESS BASELINE BP: NORMAL MMHG
STRESS BASELINE HR: 86 BPM
STRESS O2 SAT REST: 97 %
STRESS PEAK HR: 162 BPM
STRESS POST ESTIMATED WORKLOAD: 10 METS
STRESS POST EXERCISE DUR MIN: 7 MIN
STRESS POST EXERCISE DUR MIN: 7 MIN
STRESS POST EXERCISE DUR SEC: 58 SEC
STRESS POST EXERCISE DUR SEC: 58 SEC
STRESS POST O2 SAT PEAK: 99 %
STRESS POST PEAK BP: 152 MMHG
STRESS POST PEAK HR: 162 BPM
STRESS POST PEAK SYSTOLIC BP: 152 MMHG
TARGET HR FORMULA: NORMAL
TEST INDICATION: NORMAL
TR MAX PG: 11 MMHG
TR PEAK VELOCITY: 1.7 M/S
TRICUSPID VALVE PEAK REGURGITATION VELOCITY: 1.68 M/S

## 2025-03-18 PROCEDURE — 93350 STRESS TTE ONLY: CPT

## 2025-03-18 PROCEDURE — 93350 STRESS TTE ONLY: CPT | Performed by: INTERNAL MEDICINE

## 2025-03-19 ENCOUNTER — RESULTS FOLLOW-UP (OUTPATIENT)
Dept: FAMILY MEDICINE CLINIC | Facility: CLINIC | Age: 50
End: 2025-03-19

## 2025-04-08 ENCOUNTER — OFFICE VISIT (OUTPATIENT)
Dept: FAMILY MEDICINE CLINIC | Facility: CLINIC | Age: 50
End: 2025-04-08
Payer: COMMERCIAL

## 2025-04-08 VITALS
OXYGEN SATURATION: 99 % | SYSTOLIC BLOOD PRESSURE: 112 MMHG | DIASTOLIC BLOOD PRESSURE: 62 MMHG | HEIGHT: 62 IN | WEIGHT: 134.4 LBS | BODY MASS INDEX: 24.73 KG/M2 | TEMPERATURE: 97.4 F | HEART RATE: 90 BPM

## 2025-04-08 DIAGNOSIS — R10.13 EPIGASTRIC PAIN: ICD-10-CM

## 2025-04-08 DIAGNOSIS — K21.9 GASTROESOPHAGEAL REFLUX DISEASE, UNSPECIFIED WHETHER ESOPHAGITIS PRESENT: Primary | ICD-10-CM

## 2025-04-08 DIAGNOSIS — E78.5 HYPERLIPIDEMIA, UNSPECIFIED HYPERLIPIDEMIA TYPE: ICD-10-CM

## 2025-04-08 DIAGNOSIS — G43.019 INTRACTABLE MIGRAINE WITHOUT AURA AND WITHOUT STATUS MIGRAINOSUS: ICD-10-CM

## 2025-04-08 DIAGNOSIS — K29.70 GASTRITIS WITHOUT BLEEDING, UNSPECIFIED CHRONICITY, UNSPECIFIED GASTRITIS TYPE: ICD-10-CM

## 2025-04-08 PROCEDURE — 99214 OFFICE O/P EST MOD 30 MIN: CPT | Performed by: PHYSICIAN ASSISTANT

## 2025-04-08 RX ORDER — FAMOTIDINE 40 MG/1
40 TABLET, FILM COATED ORAL
Qty: 30 TABLET | Refills: 1 | Status: SHIPPED | OUTPATIENT
Start: 2025-04-08

## 2025-04-08 RX ORDER — RIZATRIPTAN BENZOATE 5 MG/1
5 TABLET, ORALLY DISINTEGRATING ORAL AS NEEDED
Qty: 24 TABLET | Refills: 1 | Status: SHIPPED | OUTPATIENT
Start: 2025-04-08

## 2025-04-08 RX ORDER — SUCRALFATE 1 G/1
1 TABLET ORAL 3 TIMES DAILY
Qty: 90 TABLET | Refills: 1 | Status: SHIPPED | OUTPATIENT
Start: 2025-04-08

## 2025-04-08 RX ORDER — PANTOPRAZOLE SODIUM 40 MG/1
40 TABLET, DELAYED RELEASE ORAL DAILY
Qty: 30 TABLET | Refills: 1 | Status: SHIPPED | OUTPATIENT
Start: 2025-04-08

## 2025-04-08 NOTE — PROGRESS NOTES
Name: Arely Burns      : 1975      MRN: [id number]  Encounter Provider: Talita Vaca PA-C  Encounter Date: 2025   Encounter department: [facility]  :  Assessment & Plan  Gastroesophageal reflux disease, unspecified whether esophagitis present  Discontinue omeprazole.  Will start on Protonix in the morning, Pepcid in the evening, and Carafate with meals.  Recommended that she avoid trigger foods.  Recommended that she sit upright after meals.  She is scheduled to see gastroenterology in a few weeks.  I encouraged her to keep this appointment.  She will likely need EGD for further evaluation.  I reviewed red flag symptoms with patient.  Will also get updated labs.  Orders:  •  sucralfate (CARAFATE) 1 g tablet; Take 1 tablet (1 g total) by mouth 3 (three) times a day  •  famotidine (PEPCID) 40 MG tablet; Take 1 tablet (40 mg total) by mouth daily at bedtime  •  pantoprazole (PROTONIX) 40 mg tablet; Take 1 tablet (40 mg total) by mouth daily    Gastritis without bleeding, unspecified chronicity, unspecified gastritis type  Discontinue omeprazole.  Will start on Protonix in the morning, Pepcid in the evening, and Carafate with meals.  Recommended that she avoid trigger foods.  Recommended that she sit upright after meals.  She is scheduled to see gastroenterology in a few weeks.  I encouraged her to keep this appointment.  She will likely need EGD for further evaluation.  I reviewed red flag symptoms with patient.  Will also get updated labs.  Orders:  •  Comprehensive metabolic panel; Future  •  CBC and differential; Future  •  sucralfate (CARAFATE) 1 g tablet; Take 1 tablet (1 g total) by mouth 3 (three) times a day  •  famotidine (PEPCID) 40 MG tablet; Take 1 tablet (40 mg total) by mouth daily at bedtime  •  pantoprazole (PROTONIX) 40 mg tablet; Take 1 tablet (40 mg total) by mouth daily  •  Iron Panel (Includes Ferritin, Iron Sat%, Iron, and TIBC); Future    Epigastric pain  Likely  secondary to GERD.  Will also check labs.  Orders:  •  Lipase; Future    Hyperlipidemia, unspecified hyperlipidemia type  Patient is compliant with simvastatin.  Recommended low-fat diet.  Labs ordered to evaluate  Orders:  •  Comprehensive metabolic panel; Future  •  Lipid panel; Future    Intractable migraine without aura and without status migrainosus  Stable.  Continue Maxalt as needed  Orders:  •  rizatriptan (MAXALT-MLT) 5 mg disintegrating tablet; Take 1 tablet (5 mg total) by mouth as needed for migraine Take at the onset of migraine; if symptoms continue or return, may take another dose at least 2 hours after first dose. Take no more than 2 doses in a day.           History of Present Illness   Arely is a pleasant 49-year-old female who is here today complaining of worsening acid reflux, epigastric pain, and nausea.  She admits that this has been ongoing for months.  She has been taking omeprazole, but it is not providing significant relief.  She denies any black stools, bloody stools, tarry stools, hematemesis, or unintentional weight loss.  She admits that she sometimes feels better after she eats a meal.  She is concerned that she may have an ulcer.  She also tried taking over-the-counter Mylanta, but this did not provide any relief.  She denies any frequent NSAID use.  She has not changed her diet.  She did schedule an appointment with GI for later this month.      Review of Systems   Constitutional:  Negative for chills, diaphoresis, fatigue and fever.   HENT:  Negative for congestion, ear pain, postnasal drip, rhinorrhea, sneezing, sore throat and trouble swallowing.    Eyes:  Negative for pain and visual disturbance.   Respiratory:  Negative for apnea, cough, shortness of breath and wheezing.    Cardiovascular:  Negative for chest pain and palpitations.   Gastrointestinal:  Positive for abdominal pain and nausea. Negative for constipation, diarrhea and vomiting.   Genitourinary:  Negative for  "dysuria.   Musculoskeletal:  Negative for arthralgias, gait problem and myalgias.   Neurological:  Negative for dizziness, syncope, weakness, light-headedness, numbness and headaches.   Psychiatric/Behavioral:  Negative for suicidal ideas. The patient is not nervous/anxious.        Objective   /62   Pulse 90   Temp (!) 97.4 °F (36.3 °C)   Ht 5' 2\" (1.575 m)   Wt 61 kg (134 lb 6.4 oz)   SpO2 99%   BMI 24.58 kg/m²      Physical Exam  Vitals and nursing note reviewed.   Constitutional:       General: She is not in acute distress.     Appearance: She is well-developed. She is not diaphoretic.   HENT:      Head: Normocephalic and atraumatic.      Right Ear: Hearing, tympanic membrane, ear canal and external ear normal.      Left Ear: Hearing, tympanic membrane, ear canal and external ear normal.      Nose: Nose normal. No mucosal edema or rhinorrhea.      Mouth/Throat:      Pharynx: No oropharyngeal exudate or posterior oropharyngeal erythema.   Eyes:      Extraocular Movements: Extraocular movements intact.   Cardiovascular:      Rate and Rhythm: Normal rate and regular rhythm.      Heart sounds: Normal heart sounds. No murmur heard.     No friction rub. No gallop.   Pulmonary:      Effort: Pulmonary effort is normal. No respiratory distress.      Breath sounds: Normal breath sounds. No wheezing or rales.   Abdominal:      General: Bowel sounds are normal. There is no distension.      Palpations: Abdomen is soft.      Tenderness: There is abdominal tenderness (Epigastric). There is no guarding.   Musculoskeletal:         General: Normal range of motion.      Cervical back: Normal range of motion and neck supple.   Skin:     General: Skin is warm and dry.      Findings: No rash.   Neurological:      Mental Status: She is alert and oriented to person, place, and time.   Psychiatric:         Behavior: Behavior normal.         Thought Content: Thought content normal.         Judgment: Judgment normal.         "

## 2025-04-08 NOTE — ASSESSMENT & PLAN NOTE
Discontinue omeprazole.  Will start on Protonix in the morning, Pepcid in the evening, and Carafate with meals.  Recommended that she avoid trigger foods.  Recommended that she sit upright after meals.  She is scheduled to see gastroenterology in a few weeks.  I encouraged her to keep this appointment.  She will likely need EGD for further evaluation.  I reviewed red flag symptoms with patient.  Will also get updated labs.  Orders:  •  Comprehensive metabolic panel; Future  •  CBC and differential; Future  •  sucralfate (CARAFATE) 1 g tablet; Take 1 tablet (1 g total) by mouth 3 (three) times a day  •  famotidine (PEPCID) 40 MG tablet; Take 1 tablet (40 mg total) by mouth daily at bedtime  •  pantoprazole (PROTONIX) 40 mg tablet; Take 1 tablet (40 mg total) by mouth daily  •  Iron Panel (Includes Ferritin, Iron Sat%, Iron, and TIBC); Future

## 2025-04-08 NOTE — ASSESSMENT & PLAN NOTE
Patient is compliant with simvastatin.  Recommended low-fat diet.  Labs ordered to evaluate  Orders:  •  Comprehensive metabolic panel; Future  •  Lipid panel; Future

## 2025-04-09 ENCOUNTER — APPOINTMENT (OUTPATIENT)
Dept: LAB | Facility: MEDICAL CENTER | Age: 50
End: 2025-04-09
Payer: COMMERCIAL

## 2025-04-09 DIAGNOSIS — K29.70 GASTRITIS WITHOUT BLEEDING, UNSPECIFIED CHRONICITY, UNSPECIFIED GASTRITIS TYPE: ICD-10-CM

## 2025-04-09 DIAGNOSIS — R30.0 DYSURIA: Primary | ICD-10-CM

## 2025-04-09 DIAGNOSIS — E78.5 HYPERLIPIDEMIA, UNSPECIFIED HYPERLIPIDEMIA TYPE: ICD-10-CM

## 2025-04-09 DIAGNOSIS — R10.13 EPIGASTRIC PAIN: ICD-10-CM

## 2025-04-09 LAB
ALBUMIN SERPL BCG-MCNC: 4.6 G/DL (ref 3.5–5)
ALP SERPL-CCNC: 68 U/L (ref 34–104)
ALT SERPL W P-5'-P-CCNC: 15 U/L (ref 7–52)
ANION GAP SERPL CALCULATED.3IONS-SCNC: 10 MMOL/L (ref 4–13)
AST SERPL W P-5'-P-CCNC: 15 U/L (ref 13–39)
BASOPHILS # BLD AUTO: 0.06 THOUSANDS/ÂΜL (ref 0–0.1)
BASOPHILS NFR BLD AUTO: 1 % (ref 0–1)
BILIRUB SERPL-MCNC: 1.25 MG/DL (ref 0.2–1)
BUN SERPL-MCNC: 11 MG/DL (ref 5–25)
CALCIUM SERPL-MCNC: 9.5 MG/DL (ref 8.4–10.2)
CHLORIDE SERPL-SCNC: 101 MMOL/L (ref 96–108)
CHOLEST SERPL-MCNC: 233 MG/DL (ref ?–200)
CO2 SERPL-SCNC: 28 MMOL/L (ref 21–32)
CREAT SERPL-MCNC: 0.67 MG/DL (ref 0.6–1.3)
EOSINOPHIL # BLD AUTO: 0.15 THOUSAND/ÂΜL (ref 0–0.61)
EOSINOPHIL NFR BLD AUTO: 3 % (ref 0–6)
ERYTHROCYTE [DISTWIDTH] IN BLOOD BY AUTOMATED COUNT: 13 % (ref 11.6–15.1)
FERRITIN SERPL-MCNC: 123 NG/ML (ref 30–307)
GFR SERPL CREATININE-BSD FRML MDRD: 104 ML/MIN/1.73SQ M
GLUCOSE P FAST SERPL-MCNC: 83 MG/DL (ref 65–99)
HCT VFR BLD AUTO: 42.7 % (ref 34.8–46.1)
HDLC SERPL-MCNC: 53 MG/DL
HGB BLD-MCNC: 14.1 G/DL (ref 11.5–15.4)
IMM GRANULOCYTES # BLD AUTO: 0.02 THOUSAND/UL (ref 0–0.2)
IMM GRANULOCYTES NFR BLD AUTO: 0 % (ref 0–2)
IRON SATN MFR SERPL: 45 % (ref 15–50)
IRON SERPL-MCNC: 158 UG/DL (ref 50–212)
LDLC SERPL CALC-MCNC: 161 MG/DL (ref 0–100)
LIPASE SERPL-CCNC: 11 U/L (ref 11–82)
LYMPHOCYTES # BLD AUTO: 2.85 THOUSANDS/ÂΜL (ref 0.6–4.47)
LYMPHOCYTES NFR BLD AUTO: 51 % (ref 14–44)
MCH RBC QN AUTO: 32.1 PG (ref 26.8–34.3)
MCHC RBC AUTO-ENTMCNC: 33 G/DL (ref 31.4–37.4)
MCV RBC AUTO: 97 FL (ref 82–98)
MONOCYTES # BLD AUTO: 0.31 THOUSAND/ÂΜL (ref 0.17–1.22)
MONOCYTES NFR BLD AUTO: 6 % (ref 4–12)
NEUTROPHILS # BLD AUTO: 2.15 THOUSANDS/ÂΜL (ref 1.85–7.62)
NEUTS SEG NFR BLD AUTO: 39 % (ref 43–75)
NONHDLC SERPL-MCNC: 180 MG/DL
NRBC BLD AUTO-RTO: 0 /100 WBCS
PLATELET # BLD AUTO: 456 THOUSANDS/UL (ref 149–390)
PMV BLD AUTO: 9.1 FL (ref 8.9–12.7)
POTASSIUM SERPL-SCNC: 4 MMOL/L (ref 3.5–5.3)
PROT SERPL-MCNC: 8.1 G/DL (ref 6.4–8.4)
RBC # BLD AUTO: 4.39 MILLION/UL (ref 3.81–5.12)
SODIUM SERPL-SCNC: 139 MMOL/L (ref 135–147)
TIBC SERPL-MCNC: 350 UG/DL (ref 250–450)
TRANSFERRIN SERPL-MCNC: 250 MG/DL (ref 203–362)
TRIGL SERPL-MCNC: 95 MG/DL (ref ?–150)
UIBC SERPL-MCNC: 192 UG/DL (ref 155–355)
WBC # BLD AUTO: 5.54 THOUSAND/UL (ref 4.31–10.16)

## 2025-04-09 PROCEDURE — 80053 COMPREHEN METABOLIC PANEL: CPT

## 2025-04-09 PROCEDURE — 82728 ASSAY OF FERRITIN: CPT

## 2025-04-09 PROCEDURE — 83540 ASSAY OF IRON: CPT

## 2025-04-09 PROCEDURE — 80061 LIPID PANEL: CPT

## 2025-04-09 PROCEDURE — 85025 COMPLETE CBC W/AUTO DIFF WBC: CPT

## 2025-04-09 PROCEDURE — 83690 ASSAY OF LIPASE: CPT

## 2025-04-09 PROCEDURE — 83550 IRON BINDING TEST: CPT

## 2025-04-09 PROCEDURE — 36415 COLL VENOUS BLD VENIPUNCTURE: CPT

## 2025-04-10 ENCOUNTER — RESULTS FOLLOW-UP (OUTPATIENT)
Dept: FAMILY MEDICINE CLINIC | Facility: CLINIC | Age: 50
End: 2025-04-10

## 2025-04-10 DIAGNOSIS — R79.89 ELEVATED PLATELET COUNT: Primary | ICD-10-CM

## 2025-04-10 NOTE — RESULT ENCOUNTER NOTE
Patient aware and asking when you want her to have it repeated? As long as you agree to watch it, she does not feel the need to hematology

## 2025-04-23 ENCOUNTER — OFFICE VISIT (OUTPATIENT)
Age: 50
End: 2025-04-23
Payer: COMMERCIAL

## 2025-04-23 VITALS
OXYGEN SATURATION: 98 % | HEART RATE: 98 BPM | WEIGHT: 135 LBS | SYSTOLIC BLOOD PRESSURE: 102 MMHG | RESPIRATION RATE: 17 BRPM | TEMPERATURE: 97.4 F | DIASTOLIC BLOOD PRESSURE: 62 MMHG | BODY MASS INDEX: 24.84 KG/M2 | HEIGHT: 62 IN

## 2025-04-23 DIAGNOSIS — R11.0 NAUSEA: ICD-10-CM

## 2025-04-23 DIAGNOSIS — K21.9 GASTROESOPHAGEAL REFLUX DISEASE, UNSPECIFIED WHETHER ESOPHAGITIS PRESENT: Primary | ICD-10-CM

## 2025-04-23 DIAGNOSIS — K29.70 GASTRITIS WITHOUT BLEEDING, UNSPECIFIED CHRONICITY, UNSPECIFIED GASTRITIS TYPE: ICD-10-CM

## 2025-04-23 DIAGNOSIS — Z12.11 SCREENING FOR COLON CANCER: ICD-10-CM

## 2025-04-23 DIAGNOSIS — R10.13 EPIGASTRIC PAIN: ICD-10-CM

## 2025-04-23 DIAGNOSIS — K59.04 CHRONIC IDIOPATHIC CONSTIPATION: ICD-10-CM

## 2025-04-23 DIAGNOSIS — E80.6 HYPERBILIRUBINEMIA: ICD-10-CM

## 2025-04-23 DIAGNOSIS — R09.A2 GLOBUS SENSATION: ICD-10-CM

## 2025-04-23 PROCEDURE — 99204 OFFICE O/P NEW MOD 45 MIN: CPT | Performed by: NURSE PRACTITIONER

## 2025-04-23 RX ORDER — POLYETHYLENE GLYCOL 3350, SODIUM SULFATE ANHYDROUS, SODIUM BICARBONATE, SODIUM CHLORIDE, POTASSIUM CHLORIDE 236; 22.74; 6.74; 5.86; 2.97 G/4L; G/4L; G/4L; G/4L; G/4L
4000 POWDER, FOR SOLUTION ORAL ONCE
Qty: 4000 ML | Refills: 0 | Status: SHIPPED | OUTPATIENT
Start: 2025-04-23 | End: 2025-04-23

## 2025-04-23 RX ORDER — SODIUM CHLORIDE, SODIUM LACTATE, POTASSIUM CHLORIDE, CALCIUM CHLORIDE 600; 310; 30; 20 MG/100ML; MG/100ML; MG/100ML; MG/100ML
125 INJECTION, SOLUTION INTRAVENOUS CONTINUOUS
OUTPATIENT
Start: 2025-04-23

## 2025-04-23 NOTE — PATIENT INSTRUCTIONS
Proceed with EGD/Colonoscopy.   Continue Protonix, Famotidine, and Sulcrafate as prescribed.   Encouraged the patient to avoid acidic or trigger foods including alcohol as much as possible.  Encouraged the patient to consider purchasing a wedge pillow to help prevent reflux symptoms while sleeping.  Encouraged the patient not to lay down or sleep for at least 3 to 4 hours after eating and to try to avoid late night snacking.  Work on increasing daily water intake to at least 1-2 bottles of water daily.   Consider started fiber supplementation or miralax as needed for constipation.   Continue to watch for red flag symptoms.   Schedule a f/u OV after Colonoscopy/EGD.     We did review GI red flag symptoms, including, but not limited to: chronic nausea, vomiting, diarrhea, chills, fever, and unintentional weight loss and should call or contact our office with any changes or concerns. I reviewed with the patient that if they notice any blood while vomiting or in their stool they should contact or office or go to the nearest emergency room for immediate evaluation. The patient was agreeable and verbalized an understanding.

## 2025-04-23 NOTE — ASSESSMENT & PLAN NOTE
While the patient was in the office today, I discussed with the patient that since she is over the age of 46 and has never had a colonoscopy, I do feel it would be beneficial to proceed with a colonoscopy at the same time as the EGD for colon cancer screening purposes.  The patient was agreeable and verbalized an understanding.    I obtained informed verbal consent from the patient. The risks/benefits/alternatives of the procedure were discussed with the patient. Risks included, but not limited to, infection, bleeding, perforation, injury to organs in the abdomen, missed lesion, and incomplete procedure were discussed. The patient gave verbal understanding and is agreeable to proceed. Bowel prep instructions were reviewed and given as ordered. Patient's questions were answered to the best of my ability and until they verbalized an understanding.      Orders:  •  Colonoscopy; Future  •  polyethylene glycol (Golytely) 4000 mL solution; Take 4,000 mL by mouth once for 1 dose Take 4000 mL by mouth once for 1 dose. Use as directed

## 2025-04-23 NOTE — ASSESSMENT & PLAN NOTE
Although we did not directly address this during her office visit today, the patient is not technically due for any repeat blood work for reevaluation of her elevated bilirubin until July and we will follow-up with her at that office visit after her procedures as most likely she has an underlying etiology of Gilbert's syndrome, but we will continue to monitor and evaluate.

## 2025-04-23 NOTE — ASSESSMENT & PLAN NOTE
Encouraged the patient to work on increasing her daily water intake to at least 1-2 bottles of water daily.  Encouraged the patient consider starting fiber supplementation or MiraLAX as needed daily for constipation.

## 2025-04-23 NOTE — ASSESSMENT & PLAN NOTE
I discussed with the patient that with their current symptoms and exam findings, I feel it would be beneficial to proceed with an EGD to further evaluate any underlying etiology that could explain their symptoms, with differential diagnoses that could include but are not limited to; GERD, gastric ulcers, Hightower's esophagus, EOE, H. pylori, etc. They were agreeable and verbalized and understanding.     I discussed the risks of procedure with the patient including, but not limited to: bleeding, infection, sore throat, and perforation. The patient gave verbal understanding and is agreeable to proceed. Patient's questions were answered to the best of my ability and until they verbalized an understanding.     In the meantime I feel is appropriate to continue the Protonix, famotidine, and Carafate as prescribed by primary care provider from now on until her follow-up office visit after the EGD and at that point time we will reevaluate her medication regimen and treatment plan needs as appropriate.  The patient was agreeable and verbalized an understanding.    Encouraged the patient to avoid acidic or trigger foods including alcohol as much as possible.  Encouraged the patient to consider purchasing a wedge pillow to help prevent reflux symptoms while sleeping.  Encouraged the patient not to lay down or sleep for at least 3 to 4 hours after eating and to try to avoid late night snacking.    Orders:  •  EGD; Future

## 2025-04-23 NOTE — PROGRESS NOTES
Name: Arely Burns      : 1975      MRN: [id number]  Encounter Provider: ROSA Scott  Encounter Date: 2025   Encounter department: [facility]  :  Assessment & Plan  Gastroesophageal reflux disease, unspecified whether esophagitis present  I discussed with the patient that with their current symptoms and exam findings, I feel it would be beneficial to proceed with an EGD to further evaluate any underlying etiology that could explain their symptoms, with differential diagnoses that could include but are not limited to; GERD, gastric ulcers, Hightower's esophagus, EOE, H. pylori, etc. They were agreeable and verbalized and understanding.     I discussed the risks of procedure with the patient including, but not limited to: bleeding, infection, sore throat, and perforation. The patient gave verbal understanding and is agreeable to proceed. Patient's questions were answered to the best of my ability and until they verbalized an understanding.     In the meantime I feel is appropriate to continue the Protonix, famotidine, and Carafate as prescribed by primary care provider from now on until her follow-up office visit after the EGD and at that point time we will reevaluate her medication regimen and treatment plan needs as appropriate.  The patient was agreeable and verbalized an understanding.    Encouraged the patient to avoid acidic or trigger foods including alcohol as much as possible.  Encouraged the patient to consider purchasing a wedge pillow to help prevent reflux symptoms while sleeping.  Encouraged the patient not to lay down or sleep for at least 3 to 4 hours after eating and to try to avoid late night snacking.    Orders:  •  EGD; Future    Nausea  Orders:  •  EGD; Future    Globus sensation  Orders:  •  EGD; Future    Epigastric pain  Orders:  •  EGD; Future    Gastritis without bleeding, unspecified chronicity, unspecified gastritis  type  Orders:  •  EGD; Future    Chronic idiopathic constipation  Encouraged the patient to work on increasing her daily water intake to at least 1-2 bottles of water daily.  Encouraged the patient consider starting fiber supplementation or MiraLAX as needed daily for constipation.       Screening for colon cancer  While the patient was in the office today, I discussed with the patient that since she is over the age of 46 and has never had a colonoscopy, I do feel it would be beneficial to proceed with a colonoscopy at the same time as the EGD for colon cancer screening purposes.  The patient was agreeable and verbalized an understanding.    I obtained informed verbal consent from the patient. The risks/benefits/alternatives of the procedure were discussed with the patient. Risks included, but not limited to, infection, bleeding, perforation, injury to organs in the abdomen, missed lesion, and incomplete procedure were discussed. The patient gave verbal understanding and is agreeable to proceed. Bowel prep instructions were reviewed and given as ordered. Patient's questions were answered to the best of my ability and until they verbalized an understanding.      Orders:  •  Colonoscopy; Future  •  polyethylene glycol (Golytely) 4000 mL solution; Take 4,000 mL by mouth once for 1 dose Take 4000 mL by mouth once for 1 dose. Use as directed    Hyperbilirubinemia  Although we did not directly address this during her office visit today, the patient is not technically due for any repeat blood work for reevaluation of her elevated bilirubin until July and we will follow-up with her at that office visit after her procedures as most likely she has an underlying etiology of Gilbert's syndrome, but we will continue to monitor and evaluate.       The patient is to schedule a follow up office visit after her EGD and colonoscopy, but understands to call or contact our offices with any issues before then or as needed    History of  Present Illness   Arely Burns is a 49 y.o. female who presents today for her initial consultation and evaluation for her worsening GERD symptoms, nausea, epigastric pain, globus sensation, gastritis, chronic idiopathic constipation and consideration for colon cancer screening.  The patient reports that over the past year or so she has had intermittent issues with GERD symptoms, nausea, and epigastric pain.  However, over the past 1 to 2 months there has been a significant increase in the symptoms to the point that it was becoming unbearable and she followed up with her primary care provider who started her on Protonix, famotidine, and Carafate with at least moderate improvement so far as she has only been on it for 2 weeks.  The patient reports she has a globus sensation where as though she feels as though there is always something in her throat and cannot get it to go down.  She also continues with epigastric pain and discomfort, but again it has improved since starting the medications.    The patient currently denies any dysphagia, vomiting, decreased appetite, or unplanned weight loss. Water Intake: Minimal daily.     The patient currently reports that they have a BM every 2-3 days and reports that it is sometimes relieving, without any consistent diarrhea, constipation, straining, melena or bloody stools.Last BM: Yesterday. Flatus: Yes, excessive, more than normal.    PMH/PSH:   Abdominal/Chest Surgery:  x 2 and cholecystectomy.  Colon Cancer: Denied  Any Cancer: Denied  Pre-Cancerous Polyps: N/A  Crohn's: N/A  Ulcerative Colitis: N/A  Hightower's Esophagus: N/A  Celiac Disease: N/A  Liver Disease: Denied    Tobacco/Vaping: Denied  ETOH: Denied  Marijuana: Denied  Illicit Drug Use: Denied    FH:  Colon Cancer: Denied  Any Cancer: Denied  Family Members with Pre-Cancerous Polyps: Denied  Crohn's: Denied  Ulcerative Colitis: Denied  Celiac Disease: Denied  Liver Disease: Denied    GI Meds: Carafate 3  "times daily with meals, famotidine 40 mg at bedtime, Protonix 40 mg daily, all of which is being prescribed by her primary care provider.  Daily NSAID Use: Denied  Daily Tylenol Use: Every 1-3 days for headaches.  Any New Meds: Famotidine, Protonix, Carafate    Imaging: (None)    Endoscopy History: EGD: (None):      COLONOSCOPY: (None):     DUE: NOW     HPI    Review of Systems A complete review of systems is negative other than that noted above in the HPI.         Objective   /62 (BP Location: Left arm, Patient Position: Sitting, Cuff Size: Adult)   Pulse 98   Temp (!) 97.4 °F (36.3 °C) (Temporal)   Resp 17   Ht 5' 2\" (1.575 m)   Wt 61.2 kg (135 lb)   SpO2 98%   BMI 24.69 kg/m²     Physical Exam   Sclera without icterus and benign. Lung sounds clear to auscultation b/l. Normal S1 & S2 upon exam. Abdomen is soft, nondistended, with mild tenderness noted on exam in the epigastric region and left upper quadrant, without any significant guarding or rebound tenderness noted upon exam, with faint bowel sounds x 4.  No edema noted of the b/l lower extremities upon exam today. Skin is non-icteric.     Lab Results: I personally reviewed relevant lab results.             "

## 2025-04-28 ENCOUNTER — TELEPHONE (OUTPATIENT)
Age: 50
End: 2025-04-28

## 2025-04-28 NOTE — TELEPHONE ENCOUNTER
Rescheduled date of EGD/colonoscopy (as of today): 5/7/25    Physician performing EGD/colonoscopy: Dr. Muro    Location of EGD/colonoscopy: [place]

## 2025-05-04 DIAGNOSIS — F41.1 GENERALIZED ANXIETY DISORDER: ICD-10-CM

## 2025-05-06 RX ORDER — SERTRALINE HYDROCHLORIDE 100 MG/1
100 TABLET, FILM COATED ORAL EVERY MORNING
Qty: 90 TABLET | Refills: 0 | Status: SHIPPED | OUTPATIENT
Start: 2025-05-06

## 2025-05-23 PROBLEM — Z12.11 SCREENING FOR COLON CANCER: Status: RESOLVED | Noted: 2025-04-23 | Resolved: 2025-05-23

## (undated) DEVICE — FLEXIBLE ADHESIVE BANDAGE,X-LARGE: Brand: CURITY

## (undated) DEVICE — CHLORAPREP HI-LITE 10.5ML ORANGE

## (undated) DEVICE — TRAY EPIDURAL SINGLE SHOT

## (undated) DEVICE — GLOVE SRG BIOGEL 8

## (undated) DEVICE — BANDAID SHEER SPOT

## (undated) DEVICE — SYRINGE 5ML LL

## (undated) DEVICE — PLASTIC ADHESIVE BANDAGE: Brand: CURITY